# Patient Record
Sex: MALE | Race: WHITE | Employment: OTHER | URBAN - METROPOLITAN AREA
[De-identification: names, ages, dates, MRNs, and addresses within clinical notes are randomized per-mention and may not be internally consistent; named-entity substitution may affect disease eponyms.]

---

## 2017-04-19 ENCOUNTER — OFFICE VISIT (OUTPATIENT)
Dept: FAMILY MEDICINE | Facility: CLINIC | Age: 52
End: 2017-04-19
Payer: COMMERCIAL

## 2017-04-19 VITALS
WEIGHT: 162 LBS | RESPIRATION RATE: 16 BRPM | DIASTOLIC BLOOD PRESSURE: 52 MMHG | OXYGEN SATURATION: 96 % | SYSTOLIC BLOOD PRESSURE: 94 MMHG | TEMPERATURE: 98 F | BODY MASS INDEX: 24.55 KG/M2 | HEART RATE: 59 BPM | HEIGHT: 68 IN

## 2017-04-19 DIAGNOSIS — J20.9 ACUTE BRONCHITIS WITH SYMPTOMS > 10 DAYS: ICD-10-CM

## 2017-04-19 DIAGNOSIS — F43.21 ADJUSTMENT DISORDER WITH DEPRESSED MOOD: Primary | ICD-10-CM

## 2017-04-19 PROCEDURE — 99214 OFFICE O/P EST MOD 30 MIN: CPT | Performed by: NURSE PRACTITIONER

## 2017-04-19 RX ORDER — AZITHROMYCIN 250 MG/1
TABLET, FILM COATED ORAL
Qty: 6 TABLET | Refills: 0 | Status: SHIPPED | OUTPATIENT
Start: 2017-04-19 | End: 2018-03-27

## 2017-04-19 RX ORDER — AZITHROMYCIN 250 MG/1
TABLET, FILM COATED ORAL
Qty: 6 TABLET | Refills: 0 | Status: SHIPPED | OUTPATIENT
Start: 2017-04-19 | End: 2017-04-19

## 2017-04-19 NOTE — NURSING NOTE
"Chief Complaint   Patient presents with     URI       Initial BP 94/52 (BP Location: Left arm, Patient Position: Chair, Cuff Size: Adult Regular)  Pulse 59  Temp 98  F (36.7  C) (Tympanic)  Resp 16  Ht 5' 7.91\" (1.725 m)  Wt 162 lb (73.5 kg)  SpO2 96%  BMI 24.7 kg/m2 Estimated body mass index is 24.7 kg/(m^2) as calculated from the following:    Height as of this encounter: 5' 7.91\" (1.725 m).    Weight as of this encounter: 162 lb (73.5 kg).  Medication Reconciliation: complete  "

## 2017-04-19 NOTE — PROGRESS NOTES
SUBJECTIVE:                                                    Luis Eduardo Stone is a 52 year old male who presents to clinic today for the following health issues:      Acute Illness   Acute illness concerns: cough  Onset: 3 weeks    Fever: not checked    Chills/Sweats: YES    Headache (location?): no     Sinus Pressure:no    Conjunctivitis:  Dry feeling    Ear Pain: no    Rhinorrhea: YES- green drainage    Congestion: YES- nasal, chest    Sore Throat: no      Cough: YES-productive of green sputum, with mild shortness of breath, chest pain    Wheeze: no     Decreased Appetite: YES    Nausea: no     Vomiting: no     Diarrhea:  no     Dysuria/Freq.: no     Fatigue/Achiness: YES- fatigued    Sick/Strep Exposure: no      Therapies Tried and outcome: none      PROBLEMS TO ADD ON...  Abnormal Mood Symptoms     Onset: years    Description:   Depression: YES- working at a job he hates, cat just   Anxiety: no    Accompanying Signs & Symptoms:  Still participating in activities that you used to enjoy: no  Fatigue: YES  Irritability: no  Difficulty concentrating: YES  Changes in appetite: no  Problems with sleep: YES  Heart racing/beating fast : no  Thoughts of hurting yourself or others: none     History:   Recent stress: YES  Prior depression hospitalization: None  Family history of depression: no  Family history of anxiety: no      Precipitating factors:   Alcohol/drug use: no    Alleviating factors:  n/a       Therapies Tried and outcome: None      Problem list and histories reviewed & adjusted, as indicated.  Additional history: as documented    Patient Active Problem List   Diagnosis     CARDIOVASCULAR SCREENING; LDL GOAL LESS THAN 160     Hypothyroidism     Mild asthma exacerbation     Family history of pseudogout     Past Surgical History:   Procedure Laterality Date     ORTHOPEDIC SURGERY  1979    right thumb repair       Social History   Substance Use Topics     Smoking status: Never Smoker     Smokeless  "tobacco: Never Used     Alcohol use Yes      Comment: social     Family History   Problem Relation Age of Onset     Breast Cancer Maternal Grandmother      DIABETES Maternal Grandmother            Reviewed and updated as needed this visit by clinical staff       Reviewed and updated as needed this visit by Provider         ROS:  Constitutional, HEENT, cardiovascular, pulmonary, gi and gu systems are negative, except as otherwise noted.    OBJECTIVE:                                                    BP 94/52 (BP Location: Left arm, Patient Position: Chair, Cuff Size: Adult Regular)  Pulse 59  Temp 98  F (36.7  C) (Tympanic)  Resp 16  Ht 5' 7.91\" (1.725 m)  Wt 162 lb (73.5 kg)  SpO2 96%  BMI 24.7 kg/m2  Body mass index is 24.7 kg/(m^2).  GENERAL: healthy, alert and no distress  EYES: Eyes grossly normal to inspection, PERRL and conjunctivae and sclerae normal  HENT: ear canals and TM's normal, nose and mouth without ulcers or lesions  NECK: no adenopathy, no asymmetry, masses, or scars and thyroid normal to palpation  RESP: no rales , no rhonchi and expiratory wheezes bilateral  CV: regular rates and rhythm, normal S1 S2, no S3 or S4 and no murmur, click or rub  MS: no gross musculoskeletal defects noted, no edema  SKIN: no suspicious lesions or rashes  NEURO: Normal strength and tone, mentation intact and speech normal  PSYCH: mentation appears normal, affect flat, judgement and insight intact and appearance well groomed    Diagnostic Test Results:  none      ASSESSMENT/PLAN:                                                        ICD-10-CM    1. Adjustment disorder with depressed mood F43.21 MENTAL HEALTH REFERRAL   2. Acute bronchitis with symptoms > 10 days J20.9 azithromycin (ZITHROMAX) 250 MG tablet            CONSULTATION/REFERRAL to MENTAL HEALTH    FUTURE APPOINTMENTS:       - Follow up in 1 week for unresolved sx, sooner for new or worsening sx.     Patient Instructions       Adjustment Disorder  Life " changes -- work, family, parents, children -- each can cause a great deal of stress in life. An adjustment disorder means you have trouble dealing with this change and stress. This problem can have serious results. You may feel helpless, depressed, make bad decisions, or even feel like you want to hurt yourself.  Adjustment disorder can cause anxiety or depression. It is triggered by daily stresses such as:    Death of a loved one    Divorce    Marriage    General life changes such as changing or leaving a job    Moving    Illness or other health issue for you or a family  member    Sex    Money     Symptoms may include:    Sadness, crying    Anxiety    Insomnia    Poor concentration    Trouble doing simple things    New problems at work or with family or friends    Loss of self-esteem    Sense of hopelessness    Feeling trapped or cut off from others  With this condition, it is common to feel sad, guilty, hopeless and restless. These feelings may continue for weeks or months. It can be helpful to identify what is causing the additional stress and take steps to get extra support. If new stressful events do not occur, it is likely that you will gradually start feeling better.  Home care    If you have been given a prescription for medicine, take it as directed.    It helps to talk about your feelings and thoughts with family or friends that understand and support you.  Follow-up care  Follow up with your healthcare provider, or therapist as advised. Let them know if this condition does not improve or gets worse.  When to seek medical advice  Call your healthcare provider right away if any of these occur:    Worsening depression or anxiety    Feeling out of control    Thoughts of harming yourself or another    Being unable to care for yourself    4394-1305 [employer]. [address] [place] [address], [place] [address]. All rights reserved. This information is not intended as a substitute for professional medical  care. Always follow your healthcare professional's instructions.        Understanding Adjustment Disorders  Most people have stress in their lives, and sometimes you may have more than you can handle. You may find it hard to cope with a stressful event. As a result, you may become anxious and depressed. You might even get sick. These can be symptoms of an adjustment disorder. But you don t have to suffer. Ask your doctor or mental health professional for help.    Common symptoms of an adjustment disorder    Hopelessness    Frequent crying    Depressed mood    Trembling or twitching    Palpitations    Health problems    Withdrawal    Anxiety or tension   What is an adjustment disorder?  Adjustment disorders sometimes occur when life gets to be too much. They often appear within 3 months of a stressful time. The symptoms vary widely. You might pretend the stressful event never happened. Or, you might think about it so much you can t eat or sleep. In most cases, your feelings may seem beyond your control.  What causes it?  The events that trigger an adjustment disorder vary from person to person. Adults may be troubled by work, money, or marriage problems. Teens are more likely bothered by school or conflict with parents. They also may find it hard to cope with a divorce or sex. The death of a loved one can be especially hard to face. So can major life changes such as a move. Poverty or a lack of social skills may make matters worse.  What can be done?  Adjustment disorders can almost always be helped by therapy. You may feel relieved just to talk to someone. In some cases, only you and your therapist will meet. In others, your whole family may be involved. You might also join a group for people with this disorder. The support and concern of others can help you recover more quickly.    8363-3153 [employer]. [address] [place] [address], [place] [address]. All rights reserved. This information is not intended  as a substitute for professional medical care. Always follow your healthcare professional's instructions.        Bronchitis, Antibiotic Treatment (Adult)    Bronchitis is an infection of the air passages (bronchial tubes) in your lungs. It often occurs when you have a cold. This illness is contagious during the first few days and is spread through the air by coughing and sneezing, or by direct contact (touching the sick person and then touching your own eyes, nose, or mouth).  Symptoms of bronchitis include cough with mucus (phlegm) and low-grade fever. Bronchitis usually lasts 7 to 14 days. Mild cases can be treated with simple home remedies. More severe infection is treated with an antibiotic.  Home care  Follow these guidelines when caring for yourself at home:    If your symptoms are severe, rest at home for the first 2 to 3 days. When you go back to your usual activities, don't let yourself get too tired.    Do not smoke. Also avoid being exposed to secondhand smoke.    You may use over-the-counter medicines to control fever or pain, unless another medicine was prescribed. (Note: If you have chronic liver or kidney disease or have ever had a stomach ulcer or gastrointestinal bleeding, talk with your healthcare provider before using these medicines. Also talk to your provider if you are taking medicine to prevent blood clots.) Aspirin should never be given to anyone younger than 18 years of age who is ill with a viral infection or fever. It may cause severe liver or brain damage.    Your appetite may be poor, so a light diet is fine. Avoid dehydration by drinking 6 to 8 glasses of fluids per day (such as water, soft drinks, sports drinks, juices, tea, or soup). Extra fluids will help loosen secretions in the nose and lungs.    Over-the-counter cough, cold, and sore-throat medicines will not shorten the length of the illness, but they may be helpful to reduce symptoms. (Note: Do not use decongestants if you have  high blood pressure.)    Finish all antibiotic medicine. Do this even if you are feeling better after only a few days.  Follow-up care  Follow up with your healthcare provider, or as advised. If you had an X-ray or ECG (electrocardiogram), a specialist will review it. You will be notified of any new findings that may affect your care.  Note: If you are age 65 or older, or if you have a chronic lung disease or condition that affects your immune system, or you smoke, talk to your healthcare provider about having pneumococcal vaccinations and a yearly influenza vaccination (flu shot).  When to seek medical advice  Call your healthcare provider right away if any of these occur:    Fever of 100.4 F (38 C) or higher    Coughing up increased amounts of colored sputum    Weakness, drowsiness, headache, facial pain, ear pain, or a stiff neck   Call 911, or get immediate medical care  Contact emergency services right away if any of these occur.    Coughing up blood    Worsening weakness, drowsiness, headache, or stiff neck    Trouble breathing, wheezing, or pain with breathing    6446-1294 [employer]. [address] [place] [address], [place] [address]. All rights reserved. This information is not intended as a substitute for professional medical care. Always follow your healthcare professional's instructions.            MINO Mcintosh [facility]

## 2017-04-19 NOTE — MR AVS SNAPSHOT
After Visit Summary   4/19/2017    Luis Eduardo Stone    MRN: [id number]           Patient Information     Date Of Birth          1965        Visit Information        Provider Department      4/19/2017 2:00 PM Renata Santamaria APRN [facility]        Today's Diagnoses     Adjustment disorder with depressed mood    -  1    Acute bronchitis with symptoms > 10 days          Care Instructions      Adjustment Disorder  Life changes -- work, family, parents, children -- each can cause a great deal of stress in life. An adjustment disorder means you have trouble dealing with this change and stress. This problem can have serious results. You may feel helpless, depressed, make bad decisions, or even feel like you want to hurt yourself.  Adjustment disorder can cause anxiety or depression. It is triggered by daily stresses such as:    Death of a loved one    Divorce    Marriage    General life changes such as changing or leaving a job    Moving    Illness or other health issue for you or a family  member    Sex    Money     Symptoms may include:    Sadness, crying    Anxiety    Insomnia    Poor concentration    Trouble doing simple things    New problems at work or with family or friends    Loss of self-esteem    Sense of hopelessness    Feeling trapped or cut off from others  With this condition, it is common to feel sad, guilty, hopeless and restless. These feelings may continue for weeks or months. It can be helpful to identify what is causing the additional stress and take steps to get extra support. If new stressful events do not occur, it is likely that you will gradually start feeling better.  Home care    If you have been given a prescription for medicine, take it as directed.    It helps to talk about your feelings and thoughts with family or friends that understand and support you.  Follow-up care  Follow up with your healthcare provider, or therapist as advised. Let them  know if this condition does not improve or gets worse.  When to seek medical advice  Call your healthcare provider right away if any of these occur:    Worsening depression or anxiety    Feeling out of control    Thoughts of harming yourself or another    Being unable to care for yourself    3027-6671 [employer]. [address] [place] [address]. All rights reserved. This information is not intended as a substitute for professional medical care. Always follow your healthcare professional's instructions.        Understanding Adjustment Disorders  Most people have stress in their lives, and sometimes you may have more than you can handle. You may find it hard to cope with a stressful event. As a result, you may become anxious and depressed. You might even get sick. These can be symptoms of an adjustment disorder. But you don t have to suffer. Ask your doctor or mental health professional for help.    Common symptoms of an adjustment disorder    Hopelessness    Frequent crying    Depressed mood    Trembling or twitching    Palpitations    Health problems    Withdrawal    Anxiety or tension   What is an adjustment disorder?  Adjustment disorders sometimes occur when life gets to be too much. They often appear within 3 months of a stressful time. The symptoms vary widely. You might pretend the stressful event never happened. Or, you might think about it so much you can t eat or sleep. In most cases, your feelings may seem beyond your control.  What causes it?  The events that trigger an adjustment disorder vary from person to person. Adults may be troubled by work, money, or marriage problems. Teens are more likely bothered by school or conflict with parents. They also may find it hard to cope with a divorce or sex. The death of a loved one can be especially hard to face. So can major life changes such as a move. Poverty or a lack of social skills may make matters worse.  What can be  done?  Adjustment disorders can almost always be helped by therapy. You may feel relieved just to talk to someone. In some cases, only you and your therapist will meet. In others, your whole family may be involved. You might also join a group for people with this disorder. The support and concern of others can help you recover more quickly.    0439-9126 [employer]. [address] [place] [address]. All rights reserved. This information is not intended as a substitute for professional medical care. Always follow your healthcare professional's instructions.        Bronchitis, Antibiotic Treatment (Adult)    Bronchitis is an infection of the air passages (bronchial tubes) in your lungs. It often occurs when you have a cold. This illness is contagious during the first few days and is spread through the air by coughing and sneezing, or by direct contact (touching the sick person and then touching your own eyes, nose, or mouth).  Symptoms of bronchitis include cough with mucus (phlegm) and low-grade fever. Bronchitis usually lasts 7 to 14 days. Mild cases can be treated with simple home remedies. More severe infection is treated with an antibiotic.  Home care  Follow these guidelines when caring for yourself at home:    If your symptoms are severe, rest at home for the first 2 to 3 days. When you go back to your usual activities, don't let yourself get too tired.    Do not smoke. Also avoid being exposed to secondhand smoke.    You may use over-the-counter medicines to control fever or pain, unless another medicine was prescribed. (Note: If you have chronic liver or kidney disease or have ever had a stomach ulcer or gastrointestinal bleeding, talk with your healthcare provider before using these medicines. Also talk to your provider if you are taking medicine to prevent blood clots.) Aspirin should never be given to anyone younger than 18 years of age who is ill with a viral infection or fever. It  may cause severe liver or brain damage.    Your appetite may be poor, so a light diet is fine. Avoid dehydration by drinking 6 to 8 glasses of fluids per day (such as water, soft drinks, sports drinks, juices, tea, or soup). Extra fluids will help loosen secretions in the nose and lungs.    Over-the-counter cough, cold, and sore-throat medicines will not shorten the length of the illness, but they may be helpful to reduce symptoms. (Note: Do not use decongestants if you have high blood pressure.)    Finish all antibiotic medicine. Do this even if you are feeling better after only a few days.  Follow-up care  Follow up with your healthcare provider, or as advised. If you had an X-ray or ECG (electrocardiogram), a specialist will review it. You will be notified of any new findings that may affect your care.  Note: If you are age 65 or older, or if you have a chronic lung disease or condition that affects your immune system, or you smoke, talk to your healthcare provider about having pneumococcal vaccinations and a yearly influenza vaccination (flu shot).  When to seek medical advice  Call your healthcare provider right away if any of these occur:    Fever of 100.4 F (38 C) or higher    Coughing up increased amounts of colored sputum    Weakness, drowsiness, headache, facial pain, ear pain, or a stiff neck   Call 911, or get immediate medical care  Contact emergency services right away if any of these occur.    Coughing up blood    Worsening weakness, drowsiness, headache, or stiff neck    Trouble breathing, wheezing, or pain with breathing    3929-7747 [employer]. [address] [place] [address], [place] [address]. All rights reserved. This information is not intended as a substitute for professional medical care. Always follow your healthcare professional's instructions.              Follow-ups after your visit        Additional Services     MENTAL HEALTH REFERRAL       Your provider has referred you to:  Behavioral Healthcare Providers Intake Scheduling [phone]  [email or web address]    All scheduling is subject to the client's specific insurance plan & benefits, provider/location availability, and provider clinical specialities.  Please arrive 15 minutes early for your first appointment and bring your completed paperwork.    Please be aware that coverage of these services is subject to the terms and limitations of your health insurance plan.  Call member services at your health plan with any benefit or coverage questions.                  Who to contact     If you have questions or need follow up information about today's clinic visit or your schedule please contact [facility] directly at [phone].  Normal or non-critical lab and imaging results will be communicated to you by "[employer]hart, letter or phone within 4 business days after the clinic has received the results. If you do not hear from us within 7 days, please contact the clinic through [employer] or phone. If you have a critical or abnormal lab result, we will notify you by phone as soon as possible.  Submit refill requests through [employer] or call your pharmacy and they will forward the refill request to us. Please allow 3 business days for your refill to be completed.          Additional Information About Your Visit        [employer] Information     [employer] gives you secure access to your electronic health record. If you see a primary care provider, you can also send messages to your care team and make appointments. If you have questions, please call your primary care clinic.  If you do not have a primary care provider, please call [phone] and they will assist you.        Care EveryWhere ID     This is your Care EveryWhere ID. This could be used by other organizations to access your [facility] medical records  [id number]        Your Vitals Were     Pulse Temperature Respirations Height Pulse Oximetry BMI (Body Mass Index)    59 98  F  "(36.7  C) (Tympanic) 16 5' 7.91\" (1.725 m) 96% 24.7 kg/m2       Blood Pressure from Last 3 Encounters:   04/19/17 94/52   11/04/16 134/77   06/16/16 108/74    Weight from Last 3 Encounters:   04/19/17 162 lb (73.5 kg)   11/04/16 160 lb (72.6 kg)   06/16/16 160 lb (72.6 kg)              We Performed the Following     MENTAL HEALTH REFERRAL          Today's Medication Changes          These changes are accurate as of: 4/19/17  2:28 PM.  If you have any questions, ask your nurse or doctor.               Start taking these medicines.        Dose/Directions    azithromycin 250 MG tablet   Commonly known as:  ZITHROMAX   Used for:  Acute bronchitis with symptoms > 10 days   Started by:  Renata Santamaria APRN CNP        Two tablets first day, then one tablet daily for four days.   Quantity:  6 tablet   Refills:  0            Where to get your medicines      These medications were sent to [facility] [address] [place] [address] AT [address] [address]     Phone:  [phone]     azithromycin 250 MG tablet                Primary Care Provider    None Specified       No primary provider on file.        Thank you!     Thank you for choosing [facility]  for your care. Our goal is always to provide you with excellent care. Hearing back from our patients is one way we can continue to improve our services. Please take a few minutes to complete the written survey that you may receive in the mail after your visit with us. Thank you!             Your Updated Medication List - Protect others around you: Learn how to safely use, store and throw away your medicines at www.disposemymeds.org.          This list is accurate as of: 4/19/17  2:28 PM.  Always use your most recent med list.                   Brand Name Dispense Instructions for use    ALBUTEROL INHALER 17GM      This product no longer available       azithromycin 250 MG tablet    ZITHROMAX    6 tablet "    Two tablets first day, then one tablet daily for four days.       fluticasone 50 MCG/ACT spray    FLONASE    1 g    Spray 1-2 sprays into both nostrils daily       indomethacin 25 MG capsule    INDOCIN    60 capsule    Take 1 capsule (25 mg) by mouth 3 times daily as needed for moderate pain       SYNTHROID 125 MCG tablet   Generic drug:  levothyroxine     90 tablet    Take 1 tablet (125 mcg) by mouth daily       thyroid 30 MG tablet    TOO THYROID    90 tablet    Take 1 tablet (30 mg) by mouth daily

## 2017-04-19 NOTE — PATIENT INSTRUCTIONS
Adjustment Disorder  Life changes -- work, family, parents, children -- each can cause a great deal of stress in life. An adjustment disorder means you have trouble dealing with this change and stress. This problem can have serious results. You may feel helpless, depressed, make bad decisions, or even feel like you want to hurt yourself.  Adjustment disorder can cause anxiety or depression. It is triggered by daily stresses such as:    Death of a loved one    Divorce    Marriage    General life changes such as changing or leaving a job    Moving    Illness or other health issue for you or a family  member    Sex    Money     Symptoms may include:    Sadness, crying    Anxiety    Insomnia    Poor concentration    Trouble doing simple things    New problems at work or with family or friends    Loss of self-esteem    Sense of hopelessness    Feeling trapped or cut off from others  With this condition, it is common to feel sad, guilty, hopeless and restless. These feelings may continue for weeks or months. It can be helpful to identify what is causing the additional stress and take steps to get extra support. If new stressful events do not occur, it is likely that you will gradually start feeling better.  Home care    If you have been given a prescription for medicine, take it as directed.    It helps to talk about your feelings and thoughts with family or friends that understand and support you.  Follow-up care  Follow up with your healthcare provider, or therapist as advised. Let them know if this condition does not improve or gets worse.  When to seek medical advice  Call your healthcare provider right away if any of these occur:    Worsening depression or anxiety    Feeling out of control    Thoughts of harming yourself or another    Being unable to care for yourself    8560-8173 [employer]. [address] [place] [address], [place] [address]. All rights reserved. This information is not intended as a  substitute for professional medical care. Always follow your healthcare professional's instructions.        Understanding Adjustment Disorders  Most people have stress in their lives, and sometimes you may have more than you can handle. You may find it hard to cope with a stressful event. As a result, you may become anxious and depressed. You might even get sick. These can be symptoms of an adjustment disorder. But you don t have to suffer. Ask your doctor or mental health professional for help.    Common symptoms of an adjustment disorder    Hopelessness    Frequent crying    Depressed mood    Trembling or twitching    Palpitations    Health problems    Withdrawal    Anxiety or tension   What is an adjustment disorder?  Adjustment disorders sometimes occur when life gets to be too much. They often appear within 3 months of a stressful time. The symptoms vary widely. You might pretend the stressful event never happened. Or, you might think about it so much you can t eat or sleep. In most cases, your feelings may seem beyond your control.  What causes it?  The events that trigger an adjustment disorder vary from person to person. Adults may be troubled by work, money, or marriage problems. Teens are more likely bothered by school or conflict with parents. They also may find it hard to cope with a divorce or sex. The death of a loved one can be especially hard to face. So can major life changes such as a move. Poverty or a lack of social skills may make matters worse.  What can be done?  Adjustment disorders can almost always be helped by therapy. You may feel relieved just to talk to someone. In some cases, only you and your therapist will meet. In others, your whole family may be involved. You might also join a group for people with this disorder. The support and concern of others can help you recover more quickly.    4764-3795 [employer]. [address] [place] [address]. All rights  reserved. This information is not intended as a substitute for professional medical care. Always follow your healthcare professional's instructions.        Bronchitis, Antibiotic Treatment (Adult)    Bronchitis is an infection of the air passages (bronchial tubes) in your lungs. It often occurs when you have a cold. This illness is contagious during the first few days and is spread through the air by coughing and sneezing, or by direct contact (touching the sick person and then touching your own eyes, nose, or mouth).  Symptoms of bronchitis include cough with mucus (phlegm) and low-grade fever. Bronchitis usually lasts 7 to 14 days. Mild cases can be treated with simple home remedies. More severe infection is treated with an antibiotic.  Home care  Follow these guidelines when caring for yourself at home:    If your symptoms are severe, rest at home for the first 2 to 3 days. When you go back to your usual activities, don't let yourself get too tired.    Do not smoke. Also avoid being exposed to secondhand smoke.    You may use over-the-counter medicines to control fever or pain, unless another medicine was prescribed. (Note: If you have chronic liver or kidney disease or have ever had a stomach ulcer or gastrointestinal bleeding, talk with your healthcare provider before using these medicines. Also talk to your provider if you are taking medicine to prevent blood clots.) Aspirin should never be given to anyone younger than 18 years of age who is ill with a viral infection or fever. It may cause severe liver or brain damage.    Your appetite may be poor, so a light diet is fine. Avoid dehydration by drinking 6 to 8 glasses of fluids per day (such as water, soft drinks, sports drinks, juices, tea, or soup). Extra fluids will help loosen secretions in the nose and lungs.    Over-the-counter cough, cold, and sore-throat medicines will not shorten the length of the illness, but they may be helpful to reduce  symptoms. (Note: Do not use decongestants if you have high blood pressure.)    Finish all antibiotic medicine. Do this even if you are feeling better after only a few days.  Follow-up care  Follow up with your healthcare provider, or as advised. If you had an X-ray or ECG (electrocardiogram), a specialist will review it. You will be notified of any new findings that may affect your care.  Note: If you are age 65 or older, or if you have a chronic lung disease or condition that affects your immune system, or you smoke, talk to your healthcare provider about having pneumococcal vaccinations and a yearly influenza vaccination (flu shot).  When to seek medical advice  Call your healthcare provider right away if any of these occur:    Fever of 100.4 F (38 C) or higher    Coughing up increased amounts of colored sputum    Weakness, drowsiness, headache, facial pain, ear pain, or a stiff neck   Call 911, or get immediate medical care  Contact emergency services right away if any of these occur.    Coughing up blood    Worsening weakness, drowsiness, headache, or stiff neck    Trouble breathing, wheezing, or pain with breathing    5539-3362 The [facility]. [address] [place] [address]. All rights reserved. This information is not intended as a substitute for professional medical care. Always follow your healthcare professional's instructions.

## 2017-04-20 ENCOUNTER — TELEPHONE (OUTPATIENT)
Dept: FAMILY MEDICINE | Facility: CLINIC | Age: 52
End: 2017-04-20

## 2017-04-20 ASSESSMENT — PATIENT HEALTH QUESTIONNAIRE - PHQ9: SUM OF ALL RESPONSES TO PHQ QUESTIONS 1-9: 13

## 2017-04-20 NOTE — LETTER
[facility]  [address] [place] [id number]  Phone: [phone]    04/20/17    Woodtessa BOLTON Bertha  [address] [address]      To whom it may concern:     Luis Eduardo was seen in clinic 4/19/17. Please excuse him from missed work.    Sincerely,      MINO Mcintosh CNP

## 2017-04-20 NOTE — TELEPHONE ENCOUNTER
Reason for Call:  Other call back and Letter    Detailed comments: He was seen yesterday and needs a letter for work.  He is wondering if the letter can be sent through my-chart?  Please advise.    Phone Number Patient can be reached at: Home number on file [phone] (home)    Best Time: any    Can we leave a detailed message on this number? YES    Call taken on 4/20/2017 at 7:39 AM by Earnestine Valencia

## 2017-04-20 NOTE — TELEPHONE ENCOUNTER
Letter sent through Delivery Agent, unable to send with signature. Patient is to notify if letter should be faxed or picked up with signature. MINO Allison CNP

## 2017-10-18 ENCOUNTER — TELEPHONE (OUTPATIENT)
Dept: FAMILY MEDICINE | Facility: CLINIC | Age: 52
End: 2017-10-18

## 2017-10-18 NOTE — LETTER
October 18, 2017      Luis Eduardo Stone  [address] [address]        Dear Dr. Vanessa Hoff's Care Team has been reviewing your chart and at this time you are due for colorectal cancer screening.  You can either have a colonoscopy or complete a FIT test at home.  Please call and schedule an appointment.   We are trying to help our patients achieve and maintain their health care goals.  If you are receiving your healthcare at another facility, please notify us of that as well, as we will update your chart.      If you have any questions, please feel free to call the clinic at [phone].    Thank you.        Sincerely,        Anh Aquino MD/kr

## 2017-11-29 ENCOUNTER — TELEPHONE (OUTPATIENT)
Dept: FAMILY MEDICINE | Facility: CLINIC | Age: 52
End: 2017-11-29

## 2017-11-29 DIAGNOSIS — Z13.6 CARDIOVASCULAR SCREENING; LDL GOAL LESS THAN 160: Primary | ICD-10-CM

## 2017-11-29 DIAGNOSIS — E03.9 HYPOTHYROIDISM: ICD-10-CM

## 2017-11-29 NOTE — TELEPHONE ENCOUNTER
Reason for Call: Request for an order or referral:    Order or referral being requested: Blood work    Date needed: Before Friday    Has the patient been seen by the PCP for this problem? unsure    Additional comments: He has an appt on Friday to get his Thyroid checked.  He would like the orders placed.  Please advise.    Phone number Patient can be reached at:  Home number on file [phone] (home)    Best Time:  any    Can we leave a detailed message on this number?  YES    Call taken on 11/29/2017 at 4:46 PM by Earnestine Valencia

## 2017-11-30 NOTE — TELEPHONE ENCOUNTER
Patient called and told of order being placed. Offered Lipid but patient declined. Elina MORAES RN

## 2017-12-01 DIAGNOSIS — E03.9 HYPOTHYROIDISM, UNSPECIFIED TYPE: ICD-10-CM

## 2017-12-01 DIAGNOSIS — E03.9 HYPOTHYROIDISM: ICD-10-CM

## 2017-12-01 DIAGNOSIS — E06.3 HYPOTHYROIDISM DUE TO HASHIMOTO'S THYROIDITIS: ICD-10-CM

## 2017-12-01 LAB — TSH SERPL DL<=0.005 MIU/L-ACNC: 0.62 MU/L (ref 0.4–4)

## 2017-12-01 PROCEDURE — 84443 ASSAY THYROID STIM HORMONE: CPT | Performed by: FAMILY MEDICINE

## 2017-12-01 PROCEDURE — 36415 COLL VENOUS BLD VENIPUNCTURE: CPT | Performed by: FAMILY MEDICINE

## 2017-12-04 NOTE — PROGRESS NOTES
Please inform patient that test result was within normal parameters.   Thank you.     Anh Aquino M.D.

## 2017-12-05 RX ORDER — LEVOTHYROXINE SODIUM 125 MCG
125 TABLET ORAL DAILY
Qty: 30 TABLET | Refills: 0 | Status: SHIPPED | OUTPATIENT
Start: 2017-12-05 | End: 2017-12-13

## 2017-12-05 NOTE — TELEPHONE ENCOUNTER
11/4/2016  [facility]  Clinic coordinator notified to contact pt for appointment and needed labs. 30 day rebecca refill sent to pharmacy.

## 2017-12-11 ENCOUNTER — TELEPHONE (OUTPATIENT)
Dept: ENDOCRINOLOGY | Facility: CLINIC | Age: 52
End: 2017-12-11

## 2017-12-11 NOTE — TELEPHONE ENCOUNTER
Left voicemail to pt re: 30 day refill sent to [facility] 12/5 with zero refills, he needs to be seen in clinic for further refills or have his PCP do future refills.

## 2017-12-11 NOTE — TELEPHONE ENCOUNTER
Pt called to request refill for Thyroid NP.    Pt has already received 2 emergency supplies from pharmacy & has 1 day supply left.     Pt preferred pharm is [facility] [address] [place] [address] AT [facility]    Pt can be called at [phone].

## 2017-12-12 DIAGNOSIS — E06.3 HYPOTHYROIDISM DUE TO HASHIMOTO'S THYROIDITIS: ICD-10-CM

## 2017-12-12 DIAGNOSIS — E03.9 HYPOTHYROIDISM, UNSPECIFIED TYPE: ICD-10-CM

## 2017-12-13 ENCOUNTER — TELEPHONE (OUTPATIENT)
Dept: FAMILY MEDICINE | Facility: CLINIC | Age: 52
End: 2017-12-13

## 2017-12-13 ENCOUNTER — CARE COORDINATION (OUTPATIENT)
Dept: ENDOCRINOLOGY | Facility: CLINIC | Age: 52
End: 2017-12-13

## 2017-12-13 ENCOUNTER — TELEPHONE (OUTPATIENT)
Dept: ENDOCRINOLOGY | Facility: CLINIC | Age: 52
End: 2017-12-13

## 2017-12-13 DIAGNOSIS — E03.9 HYPOTHYROIDISM, UNSPECIFIED TYPE: ICD-10-CM

## 2017-12-13 DIAGNOSIS — E06.3 HYPOTHYROIDISM DUE TO HASHIMOTO'S THYROIDITIS: ICD-10-CM

## 2017-12-13 RX ORDER — THYROID 30 MG/1
30 TABLET ORAL DAILY
Qty: 90 TABLET | Refills: 0 | Status: SHIPPED | OUTPATIENT
Start: 2017-12-13 | End: 2018-02-26

## 2017-12-13 RX ORDER — LEVOTHYROXINE SODIUM 125 MCG
125 TABLET ORAL DAILY
Qty: 90 TABLET | Refills: 0 | Status: CANCELLED | OUTPATIENT
Start: 2017-12-13

## 2017-12-13 RX ORDER — LEVOTHYROXINE SODIUM 125 MCG
125 TABLET ORAL DAILY
Qty: 90 TABLET | Refills: 0 | Status: SHIPPED | OUTPATIENT
Start: 2017-12-13 | End: 2018-04-06

## 2017-12-13 RX ORDER — THYROID 30 MG/1
30 TABLET ORAL DAILY
Qty: 90 TABLET | Refills: 0 | Status: CANCELLED | OUTPATIENT
Start: 2017-12-13

## 2017-12-13 NOTE — TELEPHONE ENCOUNTER
"Social Work Brief Intervention  [facility]    Data/Intervention:    Patient Name:  Luis Eduardo Stone  /Age:  1965 (52 year old)    Visit Type: telephone  Referral Source: Zee Negron RN  Reason for Referral:  Insurance and coping concerns    Collaborated With:    Xin Yoo    Patient Concerns/Issues:  Pt needing medication refills and hasn't had appt here since 2016. He reports that he is trying to work with [employer] on a plan. He was previously on [employer] but his income is higher now and he likely needs to purchase a health plan thru [employer]. Offered info re a [employer] navigator that could assist him in his area with getting a health plan but he declined. He felt he wasn't treated well trying to get refills on his meds and he got frustrated. He commented to Zee that he can get suicidal when he's off the meds. He indicated to me that has had times of being depressed and feeling suicidal but not currently. He said \"my brother was murdered, I wouldn't do that to my family\" (suicide). He tried Wellbutrin in the past but didn't like the side effects. He states he has had bad experiences with psychologists in the past.    Intervention/Education/Resources Provided:  Provided info re getting coupons online for discount on his medications.   Discussed getting treatment for depression like other health conditions and that often people have to try different medications to find the best fit. Encouraged him to go to the ER, call his PCP if he has increased symptoms of depression.     Assessment/Plan:  Pt satisfied with getting his RX re filled and with the info discussed today. No further f/u planned at this time.     Provided patient/family with contact information and availability to follow up as needed.        "

## 2018-01-03 DIAGNOSIS — E03.9 HYPOTHYROIDISM, UNSPECIFIED TYPE: ICD-10-CM

## 2018-01-03 DIAGNOSIS — E06.3 HYPOTHYROIDISM DUE TO HASHIMOTO'S THYROIDITIS: ICD-10-CM

## 2018-01-03 RX ORDER — LEVOTHYROXINE SODIUM 125 MCG
125 TABLET ORAL DAILY
Qty: 90 TABLET | Refills: 0 | Status: CANCELLED | OUTPATIENT
Start: 2018-01-03

## 2018-02-26 DIAGNOSIS — E06.3 HYPOTHYROIDISM DUE TO HASHIMOTO'S THYROIDITIS: ICD-10-CM

## 2018-02-27 RX ORDER — THYROID 30 MG/1
30 TABLET ORAL DAILY
Qty: 90 TABLET | Refills: 0 | Status: SHIPPED | OUTPATIENT
Start: 2018-02-27 | End: 2018-04-05

## 2018-02-27 NOTE — TELEPHONE ENCOUNTER
lvd  11/2016  I left him a message on scheduling. He was  working on finding insurance in December so I asked for an update.

## 2018-02-27 NOTE — TELEPHONE ENCOUNTER
Last Clinic Visit:  11/4/16    NV NONE         RF   12/13/17   90:0  MD APPT. OVERDUE  90 RF 12/13/17    NO PENDING  APPT.  Scheduling has been notified to contact the pt for  MD appointment

## 2018-03-01 ENCOUNTER — TELEPHONE (OUTPATIENT)
Dept: ENDOCRINOLOGY | Facility: CLINIC | Age: 53
End: 2018-03-01

## 2018-03-02 NOTE — TELEPHONE ENCOUNTER
PA needed for  Thyroid Sanders 30 mg  1 tablet daily.  DX Hypothyroid. He has used levothyroxine  back in 2001  When DX Then has been on Brand Synthroid and Sanders. He did try just Synthroid at one point but did not feel right on it.  The Synthroid- armour combination works well with him  with stable  levels and feels well.  Wish to continue  With Sanders 30 mg

## 2018-03-05 ENCOUNTER — TELEPHONE (OUTPATIENT)
Dept: ENDOCRINOLOGY | Facility: CLINIC | Age: 53
End: 2018-03-05

## 2018-03-05 NOTE — TELEPHONE ENCOUNTER
Central Prior Authorization Team   Phone: [phone]      PA Initiation    Medication: thyroid (ARMOUR THYROID) 30 MG tablet-PA initiated  Insurance Company: [employer] - Phone [phone] Fax [phone]  Pharmacy Filling the Rx: [facility] [address] [place] [address] AT [facility]  Filling Pharmacy Phone: [phone]  Filling Pharmacy Fax:    Start Date: 3/5/2018

## 2018-03-05 NOTE — TELEPHONE ENCOUNTER
----- Message from Dany Erazo MD sent at 3/5/2018  4:50 PM CST -----  Hello,  I don't think it is related to thyroid issue. He should talk to ophthalmologist.    Thanks,  Dany    ----- Message -----     From: Zee Negron RN     Sent: 3/5/2018   1:18 PM       To: Dany Erazo MD    Having macular edema   wondering if related to  Thyroid  Or how related to   and what can be done? He didn't bring it up to the Opthamologist  His last visit with me was on Visit date not found. Seen 2016 scheduled to come in this month I told him to call the opthamologist and then discuss at f/u

## 2018-03-05 NOTE — TELEPHONE ENCOUNTER
Prior Authorization Approval    Authorization Effective Date: 2/5/2018  Authorization Expiration Date: 3/5/2023  Medication: thyroid (ARMOUR THYROID) 30 MG tablet-PA Approved  Approved Dose/Quantity:   Reference #: [id number]   Insurance Company: [employer] - Phone [phone] Fax [phone]  Expected CoPay:     $18.56  CoPay Card Available:      Foundation Assistance Needed:    Which Pharmacy is filling the prescription (Not needed for infusion/clinic administered): [facility] [address] [place] [address] AT [facility]  Pharmacy Notified: Yes  Patient Notified: Yes

## 2018-03-05 NOTE — TELEPHONE ENCOUNTER
I  already told him to discuss with  His ophthalmologist and Dr Saenz will then discuss at f/u appointment.

## 2018-03-27 ENCOUNTER — OFFICE VISIT (OUTPATIENT)
Dept: FAMILY MEDICINE | Facility: CLINIC | Age: 53
End: 2018-03-27
Payer: COMMERCIAL

## 2018-03-27 VITALS
DIASTOLIC BLOOD PRESSURE: 72 MMHG | HEART RATE: 62 BPM | BODY MASS INDEX: 24.55 KG/M2 | TEMPERATURE: 97.1 F | HEIGHT: 68 IN | SYSTOLIC BLOOD PRESSURE: 110 MMHG | OXYGEN SATURATION: 96 % | WEIGHT: 162 LBS

## 2018-03-27 DIAGNOSIS — E03.9 HYPOTHYROIDISM, UNSPECIFIED TYPE: ICD-10-CM

## 2018-03-27 DIAGNOSIS — J45.901 MILD ASTHMA EXACERBATION: ICD-10-CM

## 2018-03-27 DIAGNOSIS — Z86.69 HISTORY OF DETACHED RETINA REPAIR: ICD-10-CM

## 2018-03-27 DIAGNOSIS — Z23 NEED FOR VACCINATION: ICD-10-CM

## 2018-03-27 DIAGNOSIS — Z12.11 SPECIAL SCREENING FOR MALIGNANT NEOPLASMS, COLON: ICD-10-CM

## 2018-03-27 DIAGNOSIS — Z98.890 HISTORY OF DETACHED RETINA REPAIR: ICD-10-CM

## 2018-03-27 DIAGNOSIS — Z01.818 PREOP GENERAL PHYSICAL EXAM: Primary | ICD-10-CM

## 2018-03-27 PROCEDURE — 90471 IMMUNIZATION ADMIN: CPT | Performed by: NURSE PRACTITIONER

## 2018-03-27 PROCEDURE — 90715 TDAP VACCINE 7 YRS/> IM: CPT | Performed by: NURSE PRACTITIONER

## 2018-03-27 PROCEDURE — 99214 OFFICE O/P EST MOD 30 MIN: CPT | Performed by: NURSE PRACTITIONER

## 2018-03-27 NOTE — NURSING NOTE
"Initial /72 (BP Location: Left arm, Patient Position: Chair, Cuff Size: Adult Regular)  Pulse 62  Temp 97.1  F (36.2  C) (Tympanic)  Ht 5' 7.9\" (1.725 m)  Wt 162 lb (73.5 kg)  SpO2 96%  BMI 24.7 kg/m2 Estimated body mass index is 24.7 kg/(m^2) as calculated from the following:    Height as of this encounter: 5' 7.9\" (1.725 m).    Weight as of this encounter: 162 lb (73.5 kg). .    Laurie Dodge    "

## 2018-03-27 NOTE — PROGRESS NOTES
[facility]  [address] [facility] [address]  [phone]  Dept: [phone]    PRE-OP EVALUATION:  Today's date: 3/27/2018    Luis Eduardo Stone (: 1965) presents for pre-operative evaluation assessment as requested by Dr. Hernandez.  He requires evaluation and anesthesia risk assessment prior to undergoing surgery/procedure for treatment of Epiretinal Membrane Repair .    Proposed Surgery/ Procedure: Vitrectomy, membrane peel of the right eye  Date of Surgery/ Procedure: 2018  Time of Surgery/ Procedure: to be determined  Hospital/Surgical Facility: [facility]  Fax number for surgical facility: [phone]  Primary Physician: System, Provider Not In  Type of Anesthesia Anticipated: to be determined    Patient has a Health Care Directive or Living Will:  NO    1. NO - Do you have a history of heart attack, stroke, stent, bypass or surgery on an artery in the head, neck, heart or legs?  2. NO - Do you ever have any pain or discomfort in your chest?  3. NO - Do you have a history of  Heart Failure?  4. NO - Are you troubled by shortness of breath when: walking on the level, up a slight hill or at night?  5. NO - Do you currently have a cold, bronchitis or other respiratory infection?  6. NO - Do you have a cough, shortness of breath or wheezing?  7. NO - Do you sometimes get pains in the calves of your legs when you walk?  8. NO - Do you or anyone in your family have previous history of blood clots?  9. NO - Do you or does anyone in your family have a serious bleeding problem such as prolonged bleeding following surgeries or cuts?  10. NO - Have you ever had problems with anemia or been told to take iron pills?  11. NO - Have you had any abnormal blood loss such as black, tarry or bloody stools, or abnormal vaginal bleeding?  12. NO - Have you ever had a blood transfusion?  13. NO - Have you or any of your relatives ever had problems with anesthesia?  14. YES  - Do you have sleep apnea, excessive snoring or daytime drowsiness? snoring  15. NO - Do you have any prosthetic heart valves?  16. NO - Do you have prosthetic joints?  17. NO - Is there any chance that you may be pregnant?      HPI:     HPI related to upcoming procedure: patient planning to have Vitrectomy, membrane peel of the right eye        ASTHMA - Patient has a longstanding history of moderate-severe Asthma . Patient has been doing well overall noting no symptoms  and continues on medication regimen consisting of Albuterol  without adverse reactions or side effects.                                                                                                                                               .  HYPOTHYROIDISM - Patient has a longstanding history of chronic Hypothyroidism. Patient has been doing well, noting no tremor, insomnia, hair loss or changes in skin texture. Last TSH value of 0.62 . Continues to take medications as directed, without adverse reactions or side effects.                                                                                                                                                                                                                        .    MEDICAL HISTORY:     Patient Active Problem List    Diagnosis Date Noted     Family history of pseudogout 10/06/2015     Priority: Medium     Mild asthma exacerbation 04/08/2015     Priority: Medium     CARDIOVASCULAR SCREENING; LDL GOAL LESS THAN 160 03/02/2015     Priority: Medium     Hypothyroidism 03/02/2015     Priority: Medium      No past medical history on file.  Past Surgical History:   Procedure Laterality Date     ORTHOPEDIC SURGERY  1979    right thumb repair     Current Outpatient Prescriptions   Medication Sig Dispense Refill     thyroid (ARMOUR THYROID) 30 MG tablet Take 1 tablet (30 mg) by mouth daily *MUST BE SEEN FOR FURTHER REFILLS 90 tablet 0     SYNTHROID 125 MCG tablet Take 1 tablet (125  "mcg) by mouth daily 90 tablet 0     fluticasone (FLONASE) 50 MCG/ACT nasal spray Spray 1-2 sprays into both nostrils daily (Patient not taking: Reported on 4/19/2017) 1 g 11     indomethacin (INDOCIN) 25 MG capsule Take 1 capsule (25 mg) by mouth 3 times daily as needed for moderate pain (Patient not taking: Reported on 4/19/2017) 60 capsule 1     ALBUTEROL INHALER 17GM This product no longer available       OTC products: None, except as noted above    Allergies   Allergen Reactions     Apple GI Disturbance     Penicillins Rash      Latex Allergy: NO    Social History   Substance Use Topics     Smoking status: Never Smoker     Smokeless tobacco: Never Used     Alcohol use Yes      Comment: social     History   Drug Use No       REVIEW OF SYSTEMS:   CONSTITUTIONAL: NEGATIVE for fever, chills, change in weight  EYES: POSITIVE for history of detached retina   ENT/MOUTH: NEGATIVE for ear, mouth and throat problems  RESP: NEGATIVE for significant cough or SOB  CV: NEGATIVE for chest pain, palpitations or peripheral edema  ROS otherwise negative    EXAM:   /72 (BP Location: Left arm, Patient Position: Chair, Cuff Size: Adult Regular)  Pulse 62  Temp 97.1  F (36.2  C) (Tympanic)  Ht 5' 7.9\" (1.725 m)  Wt 162 lb (73.5 kg)  SpO2 96%  BMI 24.7 kg/m2  GENERAL APPEARANCE: healthy, alert and no distress  HENT: ear canals and TM's normal and nose and mouth without ulcers or lesions  RESP: lungs clear to auscultation - no rales, rhonchi or wheezes  CV: regular rate and rhythm, normal S1 S2, no S3 or S4 and no murmur, click or rub   ABDOMEN: soft, nontender, no HSM or masses and bowel sounds normal  NEURO: Normal strength and tone, sensory exam grossly normal, mentation intact and speech normal    DIAGNOSTICS:   No labs or EKG required for low risk surgery (cataract, skin procedure, breast biopsy, etc)    Recent Labs   Lab Test  04/14/16   0741   NA  141   POTASSIUM  4.1   CR  0.86        IMPRESSION:   Reason for " surgery/procedure:  treatment of Epiretinal Membrane Repair .  Proposed Surgery/ Procedure: Vitrectomy, membrane peel of the right eye    Diagnosis/reason for consult: Preoperative exam    The proposed surgical procedure is considered LOW risk.    REVISED CARDIAC RISK INDEX  The patient has the following serious cardiovascular risks for perioperative complications such as (MI, PE, VFib and 3  AV Block):  No serious cardiac risks  INTERPRETATION: 0 risks: Class I (very low risk - 0.4% complication rate)    The patient has the following additional risks for perioperative complications:  No identified additional risks      ICD-10-CM    1. Preop general physical exam Z01.818    2. History of detached retina repair Z98.890     Z86.69    3. Hypothyroidism, unspecified type E03.9    4. Mild asthma exacerbation J45.901    5. Special screening for malignant neoplasms, colon Z12.11 Fecal colorectal cancer screen (FIT)       RECOMMENDATIONS:       Obstructive Sleep Apnea (or suspected sleep apnea)  Hospital staff are advised to monitor for sleep related oxygen desaturations due to suspicion of ZULEIKA      APPROVAL GIVEN to proceed with proposed procedure, without further diagnostic evaluation       Signed Electronically by: MINO Mirza CNP    Copy of this evaluation report is provided to requesting physician.    [facility] Preop Guidelines

## 2018-03-27 NOTE — NURSING NOTE
Due to injection administration, patient instructed to remain in clinic for 15 minutes  afterwards, and to report any adverse reaction to me immediately.  Prior to injection verified patient identity using patient's name and date of birth.

## 2018-03-27 NOTE — MR AVS SNAPSHOT
After Visit Summary   3/27/2018    Luis Eduardo Stone    MRN: [id number]           Patient Information     Date Of Birth          1965        Visit Information        Provider Department      3/27/2018 11:20 AM Shy Guzman APRN CNP [facility]        Today's Diagnoses     Preop general physical exam    -  1    Hypothyroidism, unspecified type        Mild asthma exacerbation          Care Instructions      Before Your Surgery      Call your surgeon if there is any change in your health. This includes signs of a cold or flu (such as a sore throat, runny nose, cough, rash or fever).    Do not smoke, drink alcohol or take over the counter medicine (unless your surgeon or primary care doctor tells you to) for the 24 hours before and after surgery.    If you take prescribed drugs: Follow your doctor s orders about which medicines to take and which to stop until after surgery.    Eating and drinking prior to surgery: follow the instructions from your surgeon    Take a shower or bath the night before surgery. Use the soap your surgeon gave you to gently clean your skin. If you do not have soap from your surgeon, use your regular soap. Do not shave or scrub the surgery site.  Wear clean pajamas and have clean sheets on your bed.           Follow-ups after your visit        Who to contact     If you have questions or need follow up information about today's clinic visit or your schedule please contact [facility] directly at [phone].  Normal or non-critical lab and imaging results will be communicated to you by MyChart, letter or phone within 4 business days after the clinic has received the results. If you do not hear from us within 7 days, please contact the clinic through TRAFIhart or phone. If you have a critical or abnormal lab result, we will notify you by phone as soon as possible.  Submit refill requests through [employer] or call your pharmacy and they will  "forward the refill request to us. Please allow 3 business days for your refill to be completed.          Additional Information About Your Visit        [employer] Information     Iqua gives you secure access to your electronic health record. If you see a primary care provider, you can also send messages to your care team and make appointments. If you have questions, please call your primary care clinic.  If you do not have a primary care provider, please call [phone] and they will assist you.        Care EveryWhere ID     This is your Care EveryWhere ID. This could be used by other organizations to access your [place] medical records  [id number]        Your Vitals Were     Pulse Temperature Height Pulse Oximetry BMI (Body Mass Index)       62 97.1  F (36.2  C) (Tympanic) 5' 7.9\" (1.725 m) 96% 24.7 kg/m2        Blood Pressure from Last 3 Encounters:   03/27/18 110/72   04/19/17 94/52   11/04/16 134/77    Weight from Last 3 Encounters:   03/27/18 162 lb (73.5 kg)   04/19/17 162 lb (73.5 kg)   11/04/16 160 lb (72.6 kg)              Today, you had the following     No orders found for display         Today's Medication Changes          These changes are accurate as of 3/27/18 11:46 AM.  If you have any questions, ask your nurse or doctor.               Stop taking these medicines if you haven't already. Please contact your care team if you have questions.     azithromycin 250 MG tablet   Commonly known as:  ZITHROMAX                    Primary Care Provider Fax #    Provider Not In System [phone]                Equal Access to Services     [facility] : Hadaleksandr Garcia, waaxda luqadaha, qaybta kaalmada vaishnavi hope. So [facility] [phone].    ATENCIÓN: Si habla español, tiene a jose disposición servicios gratuitos de asistencia lingüística. Llame al [phone].    We comply with applicable federal civil rights laws and [place] laws. We do not " discriminate on the basis of race, color, national origin, age, disability, sex, sexual orientation, or gender identity.            Thank you!     Thank you for choosing [facility]  for your care. Our goal is always to provide you with excellent care. Hearing back from our patients is one way we can continue to improve our services. Please take a few minutes to complete the written survey that you may receive in the mail after your visit with us. Thank you!             Your Updated Medication List - Protect others around you: Learn how to safely use, store and throw away your medicines at www.disposemymeds.org.          This list is accurate as of 3/27/18 11:46 AM.  Always use your most recent med list.                   Brand Name Dispense Instructions for use Diagnosis    ALBUTEROL INHALER 17GM      This product no longer available        fluticasone 50 MCG/ACT spray    FLONASE    1 g    Spray 1-2 sprays into both nostrils daily    Chronic cough       indomethacin 25 MG capsule    INDOCIN    60 capsule    Take 1 capsule (25 mg) by mouth 3 times daily as needed for moderate pain    Right knee pain, Swelling of right knee joint       SYNTHROID 125 MCG tablet   Generic drug:  levothyroxine     90 tablet    Take 1 tablet (125 mcg) by mouth daily    Hypothyroidism, unspecified type, Hypothyroidism due to Hashimoto's thyroiditis       thyroid 30 MG tablet    ARMOUR THYROID    90 tablet    Take 1 tablet (30 mg) by mouth daily *MUST BE SEEN FOR FURTHER REFILLS    Hypothyroidism due to Hashimoto's thyroiditis

## 2018-04-05 DIAGNOSIS — E06.3 HYPOTHYROIDISM DUE TO HASHIMOTO'S THYROIDITIS: ICD-10-CM

## 2018-04-06 DIAGNOSIS — E03.9 HYPOTHYROIDISM, UNSPECIFIED TYPE: ICD-10-CM

## 2018-04-06 DIAGNOSIS — E06.3 HYPOTHYROIDISM DUE TO HASHIMOTO'S THYROIDITIS: ICD-10-CM

## 2018-04-06 RX ORDER — THYROID 30 MG/1
30 TABLET ORAL DAILY
Qty: 60 TABLET | Refills: 0 | Status: SHIPPED | OUTPATIENT
Start: 2018-04-06 | End: 2018-07-16

## 2018-04-06 NOTE — TELEPHONE ENCOUNTER
thyroid (ARMOUR THYROID) 30 MG tablet  Last Written Prescription Date:  2/27/18  Last Fill Quantity: 90,   # refills: 0  Last Office Visit : 11/4/16  Future Office visit:  None    Scheduling has been notified to contact the pt for appointment.      Routing refill request to provider for review/approval because:   11/16

## 2018-04-09 RX ORDER — LEVOTHYROXINE SODIUM 125 MCG
125 TABLET ORAL DAILY
Qty: 30 TABLET | Refills: 0 | Status: SHIPPED | OUTPATIENT
Start: 2018-04-09 | End: 2018-05-08

## 2018-04-09 NOTE — TELEPHONE ENCOUNTER
Last Clinic Visit:  11/4/16  [place]:NONE  OVER DUE MD APPT.  30 DAY RF    NO F/U AFTER 90 DAY RF  Scheduling has been notified to contact the pt for appointment.

## 2018-04-16 ENCOUNTER — ALLIED HEALTH/NURSE VISIT (OUTPATIENT)
Dept: FAMILY MEDICINE | Facility: CLINIC | Age: 53
End: 2018-04-16
Payer: COMMERCIAL

## 2018-04-16 DIAGNOSIS — E03.9 HYPOTHYROIDISM, UNSPECIFIED TYPE: ICD-10-CM

## 2018-04-16 DIAGNOSIS — E06.3 HYPOTHYROIDISM DUE TO HASHIMOTO'S THYROIDITIS: ICD-10-CM

## 2018-04-16 DIAGNOSIS — E03.9 HYPOTHYROIDISM, UNSPECIFIED TYPE: Primary | ICD-10-CM

## 2018-04-16 PROCEDURE — 99207 ZZC NO CHARGE NURSE ONLY: CPT

## 2018-04-16 NOTE — NURSING NOTE
Here requesting Amrita Templet to fill his thyroid meds, that endocrine has been doing, as he no longer wishes to go to endo.   See refill request in phone note.   Miguelnia Becerra RNC

## 2018-04-16 NOTE — MR AVS SNAPSHOT
After Visit Summary   4/16/2018    Luis Eduardo Stone    MRN: [id number]           Patient Information     Date Of Birth          1965        Visit Information        Provider Department      4/16/2018 3:30 PM CRUZ HOLBROOK/LILLI BRANTLEY [facility]        Today's Diagnoses     Hypothyroidism, unspecified type    -  1       Follow-ups after your visit        Who to contact     If you have questions or need follow up information about today's clinic visit or your schedule please contact [facility] directly at [phone].  Normal or non-critical lab and imaging results will be communicated to you by [employer], letter or phone within 4 business days after the clinic has received the results. If you do not hear from us within 7 days, please contact the clinic through goodideazst or phone. If you have a critical or abnormal lab result, we will notify you by phone as soon as possible.  Submit refill requests through [employer] or call your pharmacy and they will forward the refill request to us. Please allow 3 business days for your refill to be completed.          Additional Information About Your Visit        MyChart Information     [employer] gives you secure access to your electronic health record. If you see a primary care provider, you can also send messages to your care team and make appointments. If you have questions, please call your primary care clinic.  If you do not have a primary care provider, please call [phone] and they will assist you.        Care EveryWhere ID     This is your Care EveryWhere ID. This could be used by other organizations to access your [place] medical records  [id number]         Blood Pressure from Last 3 Encounters:   03/27/18 110/72   04/19/17 94/52   11/04/16 134/77    Weight from Last 3 Encounters:   03/27/18 162 lb (73.5 kg)   04/19/17 162 lb (73.5 kg)   11/04/16 160 lb (72.6 kg)              Today, you had the following     No orders found for  display       Primary Care Provider Office Phone # Fax #    [facility] [phone] [address] [facility] [address]        Equal Access to Services     SAMI MONTIEL : Hadii aad ku hadmarcyo Sochachoali, waaxda luqadaha, qaybta kaalmada adeegyada, vaishnavi rosalespeace hayesmeryl swainandrae ty. So [facility] [phone].    ATENCIÓN: Si habla español, tiene a jose disposición servicios gratuitos de asistencia lingüística. Llame al [phone].    We comply with applicable federal civil rights laws and [place] laws. We do not discriminate on the basis of race, color, national origin, age, disability, sex, sexual orientation, or gender identity.            Thank you!     Thank you for choosing [facility]  for your care. Our goal is always to provide you with excellent care. Hearing back from our patients is one way we can continue to improve our services. Please take a few minutes to complete the written survey that you may receive in the mail after your visit with us. Thank you!             Your Updated Medication List - Protect others around you: Learn how to safely use, store and throw away your medicines at www.disposemymeds.org.          This list is accurate as of 4/16/18  3:53 PM.  Always use your most recent med list.                   Brand Name Dispense Instructions for use Diagnosis    ALBUTEROL INHALER 17GM      This product no longer available        fluticasone 50 MCG/ACT spray    FLONASE    1 g    Spray 1-2 sprays into both nostrils daily    Chronic cough       indomethacin 25 MG capsule    INDOCIN    60 capsule    Take 1 capsule (25 mg) by mouth 3 times daily as needed for moderate pain    Right knee pain, Swelling of right knee joint       SYNTHROID 125 MCG tablet   Generic drug:  levothyroxine     30 tablet    Take 1 tablet (125 mcg) by mouth daily Call clinic to schedule follow up MD APPT.    Hypothyroidism, unspecified type, Hypothyroidism due to Hashimoto's  thyroiditis       thyroid 30 MG tablet    TOO THYROID    60 tablet    Take 1 tablet (30 mg) by mouth daily *MUST BE SEEN FOR FURTHER REFILLS    Hypothyroidism due to Hashimoto's thyroiditis

## 2018-04-16 NOTE — TELEPHONE ENCOUNTER
"Amrita- patient stopped in today to ask about the Thyroid refills.   \"I spoke with her about it when I was in a couple of weeks ago. I really can't afford to go back to endocrine. \"    Lab Results   Component Value Date    TSH 0.62 12/01/2017    TSH 0.91 11/04/2016     Please call Nik when the Rx's have been refilled.     Miguelina Becerra RNC    "

## 2018-04-18 RX ORDER — LEVOTHYROXINE SODIUM 125 MCG
125 TABLET ORAL DAILY
Qty: 90 TABLET | OUTPATIENT
Start: 2018-04-18

## 2018-04-18 RX ORDER — THYROID 30 MG/1
30 TABLET ORAL DAILY
Qty: 90 TABLET | OUTPATIENT
Start: 2018-04-18

## 2018-04-18 NOTE — TELEPHONE ENCOUNTER
I only saw patient for Preop physical 3/2018 as first appointment   He is on Amour- recommend that patient continue to see Endo

## 2018-04-18 NOTE — TELEPHONE ENCOUNTER
Patient states he prefers not to go back to endo as it is very expensive and he has had multiple eye surgeries so the bills are adding up. He is wondering if Shy saw his lab work from 12/2017 and wondering if he came to see her for a visit if she would then refill? Thank you!    Lupe Kamara RN

## 2018-05-08 DIAGNOSIS — E06.3 HYPOTHYROIDISM DUE TO HASHIMOTO'S THYROIDITIS: ICD-10-CM

## 2018-05-08 DIAGNOSIS — E03.9 HYPOTHYROIDISM, UNSPECIFIED TYPE: ICD-10-CM

## 2018-05-09 RX ORDER — LEVOTHYROXINE SODIUM 125 MCG
125 TABLET ORAL DAILY
Qty: 30 TABLET | Refills: 1 | Status: SHIPPED | OUTPATIENT
Start: 2018-05-09 | End: 2018-07-16

## 2018-05-09 NOTE — TELEPHONE ENCOUNTER
Last Clinic Visit: 11/4/2016  [facility] Endocrinology  Future Visit: 6/29/18   Teri refill sent qty thru pending appt.

## 2018-05-21 ENCOUNTER — TELEPHONE (OUTPATIENT)
Dept: FAMILY MEDICINE | Facility: CLINIC | Age: 53
End: 2018-05-21

## 2018-05-21 NOTE — TELEPHONE ENCOUNTER
Pt is due for a FIT test and an ACT. Pt was already spoken to regarding a FIT test so an ACT has been sent to pt with instructions on mailing it back after completion.    Gemma Augustin CMA        Panel Management Review      Patient has the following on his problem list:     Asthma review     ACT Total Scores 4/8/2016   ACT TOTAL SCORE -   ASTHMA ER VISITS -   ASTHMA HOSPITALIZATIONS -   ACT TOTAL SCORE (Goal Greater than or Equal to 20) 24   In the past 12 months, how many times did you visit the emergency room for your asthma without being admitted to the hospital? 0   In the past 12 months, how many times were you hospitalized overnight because of your asthma? 0      1. Is Asthma diagnosis on the Problem List? Yes    2. Is Asthma listed on Health Maintenance? Yes    3. Patient is due for:  ACT and AAP      Composite cancer screening  Chart review shows that this patient is due/due soon for the following Fecal Colorectal (FIT)  Summary:    Patient is due/failing the following:   AAP, ACT and FIT    Action needed:   Patient needs to do ACT.    Type of outreach:    Sent letter.    Questions for provider review:    None                                                                                                                                    Gemma Augustin CMA       Chart routed to NONE .

## 2018-05-21 NOTE — LETTER
[facility]  [address]  [facility] [place] [address]  [phone]        May 21, 2018  Luis Eduardo Stone  [address] [address]      Dear Luis Eduardo,      I care about your health and have reviewed your health plan. I have reviewed your medical conditions, medication list, and lab results and am making recommendations based on this review, to better manage your health.    You are in particular need of attention regarding:  -Asthma    I am recommending that you:   -Complete and return the attached ASTHMA CONTROL TEST.  If your total score is 19 or less or you have been to the ER or urgent care for your asthma, then please schedule an asthma followup appointment.    Here is a list of Health Maintenance topics that are due now or due soon:  Health Maintenance Due   Topic Date Due     FIT Q1 YR  02/08/1976     HIV SCREEN (SYSTEM ASSIGNED)  02/08/1984     ASTHMA CONTROL TEST Q6 MOS  10/08/2016     ASTHMA ACTION PLAN Q1 YR  04/08/2017                               Please call us at [phone] (or use Inf[employer]) to address the above recommendations.       Thank you for trusting [facility] and we appreciate the opportunity to serve you.  We look forward to supporting your healthcare needs in the future.          Healthy Regards,      Shy Guzman NP

## 2018-06-14 ENCOUNTER — TELEPHONE (OUTPATIENT)
Dept: FAMILY MEDICINE | Facility: CLINIC | Age: 53
End: 2018-06-14

## 2018-06-15 NOTE — TELEPHONE ENCOUNTER
[facility] Pre-op form request received and routed to RN for review.  I believe patient's Pre-op which was 3/27/18 is too long ago for his surgery which is scheduled for 6/19/18.

## 2018-06-15 NOTE — TELEPHONE ENCOUNTER
Amrita,    I have called the patient and informed him that H and P's need to be done within 30 days of surgery.  Patient states his doctor is the head of [place] [facility] and is trying to get this procedure approved that no H and P is required.  Per patient this procedure takes a minute to do with laser.  Understandable but we have not seen him in 3 months.  Patient is informed this is not our standard of care and our policy but will forward to his provider. Form placed on Shy Guzman's desk for consideration. Elina MORAES RN

## 2018-06-15 NOTE — TELEPHONE ENCOUNTER
"Pt left voicemail for this supervisor to complain about pre-op process. I spoke with patient for over 20 minutes, explained our policy and guidelines that a pre-op needs to be done within 30 days of surgery/procedure. Pt continues same argument as noted below, that this is a one minute procedure and he shouldn't need to be seen again and his eye surgeon said we could \"just sign off\" on this pre-op. I explained the liability of \"just signing off\" on a pre-op would lay on our shoulders, not his eye surgeons' so it is easy for the eye surgeon to make that recommendation without holding any of the responsibility to consequences.     Pt continues to argue that [facility] is just trying to make more money. I again explained the guidelines are in place for patient safety and to ensure a good outcome for all patients undergoing any procedure/surgery. I recommended the patient call his eye surgeon and ask if they can use the pre-op from 3 months ago.     Pt is dissatisfied with recommendations. Call is ended.    Clarisa Garsia, RN, BSN  Supervisor of Patient Care  [facility]  Primary Care [facility]    "

## 2018-06-18 NOTE — TELEPHONE ENCOUNTER
I last saw patient on 3/27/18- he would need a new Pre-op since they are only good for 30 days.  Patient would need to schedule an appointment for a pre-op since his last pre-op was roughly 3 months ago (90 days)

## 2018-06-18 NOTE — TELEPHONE ENCOUNTER
Patient calling back requesting status of pre-op form placed on Amrita Guzman's desk. Any update on this form? Patient states he needs a call back within 30 minutes. If form is not complete, can Clarisa Garsia call back with an updated?    Sarah Olson   [facility] [place]

## 2018-06-18 NOTE — TELEPHONE ENCOUNTER
Patient notified of provider's recommendations regarding pre-op  Patient verbalized understanding, he was not happy about it    Kassie MARTINEZ Rn

## 2018-07-16 DIAGNOSIS — E03.9 HYPOTHYROIDISM, UNSPECIFIED TYPE: ICD-10-CM

## 2018-07-16 DIAGNOSIS — E06.3 HYPOTHYROIDISM DUE TO HASHIMOTO'S THYROIDITIS: ICD-10-CM

## 2018-07-16 RX ORDER — THYROID 30 MG/1
TABLET ORAL
Qty: 30 TABLET | Refills: 1 | Status: SHIPPED | OUTPATIENT
Start: 2018-07-16 | End: 2018-09-21

## 2018-07-16 RX ORDER — LEVOTHYROXINE SODIUM 125 MCG
125 TABLET ORAL DAILY
Qty: 30 TABLET | Refills: 1 | Status: SHIPPED | OUTPATIENT
Start: 2018-07-16 | End: 2018-09-21

## 2018-07-16 NOTE — TELEPHONE ENCOUNTER
synthroid  Last Written Prescription Date:  5/9/18  Last Fill Quantity: 30,   # refills: 1  armour      Last Written Prescription Date:  4/6/18  Last Fill Quantity: 60,   # refills: 0  Last Office Visit : 11/4/16  Future Office visit:  9/21/18    Routing refill request to provider for review/approval because:  Last appt 2016    pt is out of the medications

## 2018-09-14 DIAGNOSIS — E03.9 HYPOTHYROIDISM, UNSPECIFIED TYPE: ICD-10-CM

## 2018-09-14 DIAGNOSIS — E06.3 HYPOTHYROIDISM DUE TO HASHIMOTO'S THYROIDITIS: ICD-10-CM

## 2018-09-14 RX ORDER — LEVOTHYROXINE SODIUM 125 MCG
125 TABLET ORAL DAILY
Qty: 30 TABLET | Refills: 1 | Status: CANCELLED | OUTPATIENT
Start: 2018-09-14

## 2018-09-21 ENCOUNTER — OFFICE VISIT (OUTPATIENT)
Dept: ENDOCRINOLOGY | Facility: CLINIC | Age: 53
End: 2018-09-21
Payer: COMMERCIAL

## 2018-09-21 VITALS
SYSTOLIC BLOOD PRESSURE: 134 MMHG | BODY MASS INDEX: 24.48 KG/M2 | WEIGHT: 160.5 LBS | DIASTOLIC BLOOD PRESSURE: 65 MMHG | HEART RATE: 74 BPM

## 2018-09-21 DIAGNOSIS — E03.9 HYPOTHYROIDISM, UNSPECIFIED TYPE: Primary | ICD-10-CM

## 2018-09-21 DIAGNOSIS — E06.3 HYPOTHYROIDISM DUE TO HASHIMOTO'S THYROIDITIS: ICD-10-CM

## 2018-09-21 DIAGNOSIS — E03.9 HYPOTHYROIDISM, UNSPECIFIED TYPE: ICD-10-CM

## 2018-09-21 LAB
T3 SERPL-MCNC: 96 NG/DL (ref 60–181)
T4 FREE SERPL-MCNC: 1 NG/DL (ref 0.76–1.46)
TSH SERPL DL<=0.005 MIU/L-ACNC: 1.83 MU/L (ref 0.4–4)

## 2018-09-21 RX ORDER — LEVOTHYROXINE SODIUM 125 MCG
125 TABLET ORAL DAILY
Qty: 90 TABLET | Refills: 3 | Status: SHIPPED | OUTPATIENT
Start: 2018-09-21 | End: 2019-09-24

## 2018-09-21 ASSESSMENT — PAIN SCALES - GENERAL: PAINLEVEL: MODERATE PAIN (4)

## 2018-09-21 NOTE — PROGRESS NOTES
Endocrinology Note         Luis Eduardo is a 53 year old male presents today for hypothyroidism.    HPI  Luis Eduardo Stone is a 53 years old male with hx of hypothyroidism who is here for follow up hypothyroidism. Last seen 11/2016.    He was diagnosed with hypothyroidism in 2001 during routine physical exam. He was on combination of Synthroid 100 mcg and [place] thyroid 30 mg (T4 50 mcg +T3 12.5 mcg) daily from 9848-3060. Due to shortage of [place] thyroid, he was switched to Synthroid 150 mcg alternating with 175 mcg  every other day but did not like it and wanted to use combination of Synthroid and [place] because he felt better.     Interim history:  I adjusted his thyroid medication. He is currently on Synthroid 125 mcg and NP thyroid 30 mg (T4 50 mcg +T3 12.5 mcg) over the past 2-3 years. He reported that he feels better with combination. Mood is stable. He denied chest pain, shortness of breath, palpitation, abdominal pain, diarrhea/constipation, polyuria/polydipsia, fatigue, tremor, weight change, headache, dizziness and insomnia. 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 His eyes are always a little bulging out.     Past Medical History  Hypothyroidism    Allergies  Allergies   Allergen Reactions     Apple GI Disturbance     Penicillins Rash     Medications  Current Outpatient Prescriptions   Medication Sig Dispense Refill     ALBUTEROL INHALER 17GM This product no longer available       fluticasone (FLONASE) 50 MCG/ACT nasal spray Spray 1-2 sprays into both nostrils daily (Patient not taking: Reported on 4/19/2017) 1 g 11     indomethacin (INDOCIN) 25 MG capsule Take 1 capsule (25 mg) by mouth 3 times daily as needed for moderate pain (Patient not taking: Reported on 4/19/2017) 60 capsule 1     SYNTHROID 125 MCG tablet Take 1 tablet (125 mcg) by mouth daily 30 tablet 1     Thyroid (LEVOTHYROXINE-LIOTHYRONINE) 30 MG TABS Take 30 mg by mouth       [DISCONTINUED]  thyroid (ARMOUR THYROID) 30 MG tablet 1 tab daily 30 tablet 1     Family History  No thyroid disease in the family    Social History  Social History   Substance Use Topics     Smoking status: Never Smoker     Smokeless tobacco: Never Used     Alcohol use Yes      Comment: social   works for apartment management  Drinks alcohol occasionally    ROS  Constitutional: stable weight, still has some low energy.  Eyes: no vision change, diplopia or red eyes   Neck: no difficulty swallowing, no choking, no neck pain, no neck swelling  Cardiovascular: no chest pain, palpitations  Respiratory: no dyspnea, cough, shortness of breath or wheezing   GI: no nausea, vomiting, diarrhea or constipation, no abdominal pain   : no change in urine, no dysuria or hematuria  Musculoskeletal: no joint or muscle pain or swelling   Integumentary: no concerning lesions   Neuro: no loss of strength or sensation, no numbness or tingling, no tremor, no dizziness, no headache   Endo: no polyuria or polydipsia,tend to feel warm intolerance   Heme/Lymph: no concerning bumps, no bleeding problems   Allergy: no environmental allergies   Psych: sleep ok    Physical Exam  /65  Pulse 74  Wt 72.8 kg (160 lb 8 oz)  BMI 24.48 kg/m2  Body mass index is 24.48 kg/(m^2).  Constitutional: no distress, comfortable, pleasant   Eyes: anicteric, normal extra-ocular movements, +lid lag and lid retraction  Neck: no thyromegaly  Cardiovascular: regular rate and rhythm, normal S1 and S2, no murmur  Respiratory: clear to auscultation, no wheezes or crackles, normal breath sounds   Gastrointestinal:   nontender, no hepatomegaly, no masses   Musculoskeletal: no edema   Skin: no concerning lesions, no jaundice   Neurological: cranial nerves intact, 2+ reflexes at patella, normal gait, no tremor on outstretched hands bilaterally  Psychological: appropriate mood   Lymphatic: no cervical  lymphadenopathy.      RESULTS       ASSESSMENT:    Luis Eduardo Stone is a 53  years old male with hx of hypothyroidism who is here for follow up hypothyroidism    1) Hypothyroidism: diagnosed in 2001. He was previously on combination of Synthroid and [place] thyroid until 2008. He is feeling well on this combination, Synthroid 125 mcg and NP thyroid 30 mg. He is clinically euthyroid. I will repeat lab today and adjust dose accordingly. All questions answered.    PLAN:   - check TSH, FT4, TT3   - will adjust dose and refill accordingly.  - He can follow up with PCP in 1 year for annual thyroid test    Dany Erazo MD  Endocrinology  [phone]

## 2018-09-21 NOTE — MR AVS SNAPSHOT
After Visit Summary   9/21/2018    Luis Eduardo Stone    MRN: [id number]           Patient Information     Date Of Birth          1965        Visit Information        Provider Department      9/21/2018 11:20 AM Dany Erazo MD [facility] Endocrinology        Today's Diagnoses     Hypothyroidism, unspecified type    -  1    Hypothyroidism due to Hashimoto's thyroiditis           Follow-ups after your visit        Your next 10 appointments already scheduled     Oct 11, 2018  3:00 PM CDT   New Visit with Gilbert Dudley DO   [facility] ([facility])    [address]  [address]  [facility] [place] [address] [phone]              Who to contact     Please call your clinic at [phone] to:    Ask questions about your health    Make or cancel appointments    Discuss your medicines    Learn about your test results    Speak to your doctor            Additional Information About Your Visit        MyChart Information     [email or web address] gives you secure access to your electronic health record. If you see a primary care provider, you can also send messages to your care team and make appointments. If you have questions, please call your primary care clinic.  If you do not have a primary care provider, please call [phone] and they will assist you.      [email or web address] is an electronic gateway that provides easy, online access to your medical records. With [email or web address], you can request a clinic appointment, read your test results, renew a prescription or communicate with your care team.     To access your existing account, please contact your [facility] or call [phone] for assistance.        Care EveryWhere ID     This is your Care EveryWhere ID. This could be used by other organizations to access your [place] medical records  [id number]        Your Vitals Were     Pulse BMI (Body Mass Index)                74 24.48  kg/m2           Blood Pressure from Last 3 Encounters:   09/21/18 134/65   03/27/18 110/72   04/19/17 94/52    Weight from Last 3 Encounters:   09/21/18 72.8 kg (160 lb 8 oz)   03/27/18 73.5 kg (162 lb)   04/19/17 73.5 kg (162 lb)                 Today's Medication Changes          These changes are accurate as of 9/21/18 11:59 PM.  If you have any questions, ask your nurse or doctor.               These medicines have changed or have updated prescriptions.        Dose/Directions    Levothyroxine-Liothyronine 30 MG Tabs   This may have changed:    - when to take this  - Another medication with the same name was removed. Continue taking this medication, and follow the directions you see here.   Used for:  Hypothyroidism, unspecified type   Changed by:  Dany Erazo MD        Dose:  30 mg   Take 30 mg by mouth daily   Quantity:  90 tablet   Refills:  3            Where to get your medicines      These medications were sent to [facility] [address] [place] [address] AT [address] [address]    Hours:  24-hours Phone:  [phone]     Levothyroxine-Liothyronine 30 MG Tabs    SYNTHROID 125 MCG tablet                Primary Care Provider Office Phone # Fax #    MINO [facility] [employer] [employer] [facility] [phone] [address] [address]        Equal Access to Services     SAMI MONTIEL AH: Hadii brady woodo Sodemetrio, waaxda luqadaha, qaybta kaalmada aderickeyda, vaishnavi ty. So [facility] [phone].    ATENCIÓN: Si habla español, tiene a jose disposición servicios gratuitos de asistencia lingüística. Llame al [phone].    We comply with applicable federal civil rights laws and [place] laws. We do not discriminate on the basis of race, color, national origin, age, disability, sex, sexual orientation, or gender identity.            Thank you!     Thank you for choosing [facility] ENDOCRINOLOGY  for your care. Our  goal is always to provide you with excellent care. Hearing back from our patients is one way we can continue to improve our services. Please take a few minutes to complete the written survey that you may receive in the mail after your visit with us. Thank you!             Your Updated Medication List - Protect others around you: Learn how to safely use, store and throw away your medicines at www.disposemymeds.org.          This list is accurate as of 9/21/18 11:59 PM.  Always use your most recent med list.                   Brand Name Dispense Instructions for use Diagnosis    ALBUTEROL INHALER 17GM      This product no longer available        fluticasone 50 MCG/ACT spray    FLONASE    1 g    Spray 1-2 sprays into both nostrils daily    Chronic cough       indomethacin 25 MG capsule    INDOCIN    60 capsule    Take 1 capsule (25 mg) by mouth 3 times daily as needed for moderate pain    Right knee pain, Swelling of right knee joint       Levothyroxine-Liothyronine 30 MG Tabs     90 tablet    Take 30 mg by mouth daily    Hypothyroidism, unspecified type       SYNTHROID 125 MCG tablet   Generic drug:  levothyroxine     90 tablet    Take 1 tablet (125 mcg) by mouth daily    Hypothyroidism due to Hashimoto's thyroiditis, Hypothyroidism, unspecified type

## 2018-09-21 NOTE — LETTER
9/21/2018       RE: Luis Eduardo Stone  [address] [address]     Dear Colleague,    Thank you for referring your patient, Luis Eduardo Stone, to the [facility] ENDOCRINOLOGY at [facility]. Please see a copy of my visit note below.         Endocrinology Note         Luis Eduardo is a 53 year old male presents today for hypothyroidism.    HPI  Luis Eduardo Stone is a 53 years old male with hx of hypothyroidism who is here for follow up hypothyroidism. Last seen 11/2016.    He was diagnosed with hypothyroidism in 2001 during routine physical exam. He was on combination of Synthroid 100 mcg and Unity thyroid 30 mg (T4 50 mcg +T3 12.5 mcg) daily from 2003-3008. Due to shortage of Unity thyroid, he was switched to Synthroid 150 mcg alternating with 175 mcg  every other day but did not like it and wanted to use combination of Synthroid and Unity because he felt better.     Interim history:  I adjusted his thyroid medication. He is currently on Synthroid 125 mcg and NP thyroid 30 mg (T4 50 mcg +T3 12.5 mcg) over the past 2-3 years. He reported that he feels better with combination. Mood is stable. He denied chest pain, shortness of breath, palpitation, abdominal pain, diarrhea/constipation, polyuria/polydipsia, fatigue, tremor, weight change, headache, dizziness and insomnia. 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 His eyes are always a little bulging out.     Past Medical History  Hypothyroidism    Allergies  Allergies   Allergen Reactions     Apple GI Disturbance     Penicillins Rash     Medications  Current Outpatient Prescriptions   Medication Sig Dispense Refill     ALBUTEROL INHALER 17GM This product no longer available       fluticasone (FLONASE) 50 MCG/ACT nasal spray Spray 1-2 sprays into both nostrils daily (Patient not taking: Reported on 4/19/2017) 1 g 11     indomethacin (INDOCIN) 25 MG capsule Take 1  capsule (25 mg) by mouth 3 times daily as needed for moderate pain (Patient not taking: Reported on 4/19/2017) 60 capsule 1     SYNTHROID 125 MCG tablet Take 1 tablet (125 mcg) by mouth daily 30 tablet 1     Thyroid (LEVOTHYROXINE-LIOTHYRONINE) 30 MG TABS Take 30 mg by mouth       [DISCONTINUED] thyroid (TOO THYROID) 30 MG tablet 1 tab daily 30 tablet 1     Family History  No thyroid disease in the family    Social History  Social History   Substance Use Topics     Smoking status: Never Smoker     Smokeless tobacco: Never Used     Alcohol use Yes      Comment: social   works for [employer]  Drinks alcohol occasionally    ROS  Constitutional: stable weight, still has some low energy.  Eyes: no vision change, diplopia or red eyes   Neck: no difficulty swallowing, no choking, no neck pain, no neck swelling  Cardiovascular: no chest pain, palpitations  Respiratory: no dyspnea, cough, shortness of breath or wheezing   GI: no nausea, vomiting, diarrhea or constipation, no abdominal pain   : no change in urine, no dysuria or hematuria  Musculoskeletal: no joint or muscle pain or swelling   Integumentary: no concerning lesions   Neuro: no loss of strength or sensation, no numbness or tingling, no tremor, no dizziness, no headache   Endo: no polyuria or polydipsia,tend to feel warm intolerance   Heme/Lymph: no concerning bumps, no bleeding problems   Allergy: no environmental allergies   Psych: sleep ok    Physical Exam  /65  Pulse 74  Wt 72.8 kg (160 lb 8 oz)  BMI 24.48 kg/m2  Body mass index is 24.48 kg/(m^2).  Constitutional: no distress, comfortable, pleasant   Eyes: anicteric, normal extra-ocular movements, +lid lag and lid retraction  Neck: no thyromegaly  Cardiovascular: regular rate and rhythm, normal S1 and S2, no murmur  Respiratory: clear to auscultation, no wheezes or crackles, normal breath sounds   Gastrointestinal:   nontender, no hepatomegaly, no masses   Musculoskeletal: no edema    Skin: no concerning lesions, no jaundice   Neurological: cranial nerves intact, 2+ reflexes at patella, normal gait, no tremor on outstretched hands bilaterally  Psychological: appropriate mood   Lymphatic: no cervical  lymphadenopathy.      RESULTS       ASSESSMENT:    Luis Eduardo Stone is a 53 years old male with hx of hypothyroidism who is here for follow up hypothyroidism    1) Hypothyroidism: diagnosed in 2001. He was previously on combination of Synthroid and Meade thyroid until 2008. He is feeling well on this combination, Synthroid 125 mcg and NP thyroid 30 mg. He is clinically euthyroid. I will repeat lab today and adjust dose accordingly. All questions answered.    PLAN:   - check TSH, FT4, TT3   - will adjust dose and refill accordingly.  - He can follow up with PCP in 1 year for annual thyroid test    Dayn Erazo MD  Endocrinology  [phone]

## 2018-09-24 RX ORDER — THYROID 30 MG/1
TABLET ORAL
Qty: 30 TABLET | Refills: 1 | OUTPATIENT
Start: 2018-09-24

## 2018-10-11 ENCOUNTER — OFFICE VISIT (OUTPATIENT)
Dept: ORTHOPEDICS | Facility: CLINIC | Age: 53
End: 2018-10-11
Payer: COMMERCIAL

## 2018-10-11 VITALS — SYSTOLIC BLOOD PRESSURE: 128 MMHG | DIASTOLIC BLOOD PRESSURE: 76 MMHG

## 2018-10-11 DIAGNOSIS — M25.561 CHRONIC PAIN OF RIGHT KNEE: ICD-10-CM

## 2018-10-11 DIAGNOSIS — G89.29 CHRONIC PAIN OF RIGHT KNEE: ICD-10-CM

## 2018-10-11 DIAGNOSIS — M17.11 PRIMARY OSTEOARTHRITIS OF RIGHT KNEE: Primary | ICD-10-CM

## 2018-10-11 PROCEDURE — 99213 OFFICE O/P EST LOW 20 MIN: CPT | Mod: 25 | Performed by: FAMILY MEDICINE

## 2018-10-11 PROCEDURE — 20611 DRAIN/INJ JOINT/BURSA W/US: CPT | Mod: RT | Performed by: FAMILY MEDICINE

## 2018-10-11 NOTE — LETTER
10/11/2018         RE: Luis Eduardo Stone  [address] [address]        Dear Colleague,    Thank you for referring your patient, Luis Eduardo Stone, to the [facility]. Please see a copy of my visit note below.    Luis Eduardo Stone  :  1965  DOS: 10/11/18  MRN: [id number]    Sports Medicine Clinic Visit    PCP: No primary care provider on file.    Luis Eduardo Stone is a 50 year old male who is seen in consultation at the request of  MINO Davis presenting with right knee pain.    Injury: Insidious onset of pain over last 5 week(s) (9/1/15).  Pain located over right deep anterior knee, nonradiating.  Additional Features:  Positive: intermittent swelling and grinding.  Symptoms are better with Ibuprofen and Rest.  Symptoms are worse with: stairs and walking, going from sit to stand position.  Other evaluation and/or treatments so far consists of: Ibuprofen, Rest and PCP visit.  Recent imaging completed: X-rays completed 10/6/15.  Prior History of related problems: none    Social History: works as      Interim History - 2016  Since last visit on 10/8/15 patient has worsening right knee pain.  Steroid injection last completed on 16 by Dr Aquino provided good relief for 2 months, but not as long as previous.  He would like discuss/pursue Synvisc injection today.  No new injury in the interim.    Interim History - 2018  Since last visit on 16 patient has moderate right knee pain.  Right knee synviscOne injection completed on  provided good relief for ~ 1 year.  He would like to discuss repeat synvisc vs PRP injection.  No new injury in the interim.    Review of Systems  Musculoskeletal: as above  Remainder of review of systems is negative including constitutional, CV, pulmonary, GI, Skin and Neurologic except as noted in HPI or medical history.    No past medical history on  "file.  Past Surgical History:   Procedure Laterality Date     ORTHOPEDIC SURGERY  1979    right thumb repair     Objective  /76  Ht (P) 5' 7.9\" (1.725 m)  Wt (P) 152 lb (68.9 kg)  BMI (P) 23.18 kg/m2    GENERAL APPEARANCE: healthy, alert and no distress   GAIT: NORMAL and antalgic  SKIN: no suspicious lesions or rashes  NEURO: Normal strength and tone, mentation intact and speech normal  PSYCH:  mentation appears normal and affect normal/bright    Right Knee exam minimal change    ROM:        Flexion 120  degrees       Extension -2 degrees       Range of motion limited by pain, effusion    Inspection:       no visible ecchymosis        effusion noted moderate    Skin:       no visible deformities       well perfused       capillary refill brisk    Patellar Motion:        Lateral tilt noted in patella       Crepitus noted in the patellofemoral joint    Tender:        medial patellar border       lateral patellar border       medial joint line       pes anserine bursa    Non Tender:         remainder of knee area        lateral joint line        along MCL        distal IT Band        infrapatellar tendon        tibial tubercle    Special Tests:        positive (+) Pat       neg (-) Lachmans       neg (-) anterior drawer       neg (-) posterior drawer       neg (-) varus at 0 deg and 30 deg       neg (-) valgus at 0 deg and 30 deg    Evaluation of ipsilateral kinetic chain       decreased strength with resisted abduction of the right hip       decreased strength with resisted extension of the right hip       Decreased quad tone on the right    Large Joint Injection/Arthocentesis  Date/Time: 10/11/2018 3:50 PM  Performed by: BRYANNA POLLACK  Authorized by: BRYANNA POLLACK     Indications:  Osteoarthritis  Guidance: ultrasound    Approach:  Superolateral  Location:  Knee  Site:  R knee joint  Medications:  48 mg hylan 48 MG/6ML  Aspirate amount (mL):  4  Aspirate:  Serous and " yellow  Outcome:  Tolerated well, no immediate complications  Consent Given by:  Patient  Prep: patient was prepped and draped in usual sterile fashion          Radiology  RIGHT KNEE STANDING TWO VIEWS 10/6/2015 1:47 PM   HISTORY: Pain in right knee.  COMPARISON: None.  IMPRESSION  IMPRESSION: Moderate to severe joint space narrowing in the right  medial knee compartment with mild osteophyte formation overlying both  lateral and medial knee compartments. Patellofemoral articulation  intact on the right. Calcification is noted posterior to the distal  femur measuring up to 1.2 cm. This is posterior to expected location  of fabella, or joint space. This may be within the soft tissue, though  etiology unknown. No significant knee effusion. Small patellar  osteophytes. No acute fracture. Slight cortical irregularity in the  proximal to mid left fibular diaphysis. This may be artifactual,  though limited visualization.    Assessment:  1. Primary osteoarthritis of right knee    2. Chronic pain of right knee        Plan:  Discussed the assessment with the patient.  Follow up: 4-6 weeks if not improving significantly  Repeat aspiration and synvisc injection done today under ultrasound guidance  Discussed range of conservative options including appropriate use of injection therapy consider Visco Supplementation in the future if not significant help from steroid injection  MRI and further or the referral can also be considered in the future given his intermittent swelling episodes  Likely the only surgical option would be total arthroplasty, we'll defer that discussion to the surgeon, however he is quite young to consider this and so we'll try to continue to manage conservatively for now  Expectations and limitations of synvisc were reviewed in detail  Often 4-6 weeks before full effect may be noticed  Usually covered up to every 6 months by insurance, but does not need to be repeated unless pain returns, at which point we  would re-evaluate  Potential use of CSI in future for flares of pain reviewed in detail  Encouraged modified progressive pain-free activity as tolerated  HEP and Supportive care reviewed  All questions were answered today  Contact us with additional questions or concerns  Signs and sx of concern reviewed      Gilbert Dudley DO, JUSTYN  Primary Care Sports Medicine  [facility]           Disclaimer: This note consists of symbols derived from keyboarding, dictation and/or voice recognition software. As a result, there may be errors in the script that have gone undetected. Please consider this when interpreting information found in this chart.      Again, thank you for allowing me to participate in the care of your patient.        Sincerely,        Gilbert Dudley DO

## 2018-10-11 NOTE — MR AVS SNAPSHOT
After Visit Summary   10/11/2018    Luis Eduardo Stone    MRN: [id number]           Patient Information     Date Of Birth          1965        Visit Information        Provider Department      10/11/2018 3:00 PM Gilbert Dudley DO [facility] Orthopedic [facility]        Today's Diagnoses     Primary osteoarthritis of right knee    -  1    Chronic pain of right knee           Follow-ups after your visit        Who to contact     If you have questions or need follow up information about today's clinic visit or your schedule please contact [facility] directly at [phone].  Normal or non-critical lab and imaging results will be communicated to you by [employer]art, letter or phone within 4 business days after the clinic has received the results. If you do not hear from us within 7 days, please contact the clinic through [facility] or phone. If you have a critical or abnormal lab result, we will notify you by phone as soon as possible.  Submit refill requests through [employer] or call your pharmacy and they will forward the refill request to us. Please allow 3 business days for your refill to be completed.          Additional Information About Your Visit        MyChart Information     [employer] gives you secure access to your electronic health record. If you see a primary care provider, you can also send messages to your care team and make appointments. If you have questions, please call your primary care clinic.  If you do not have a primary care provider, please call [phone] and they will assist you.        Care EveryWhere ID     This is your Care EveryWhere ID. This could be used by other organizations to access your [place] medical records  [id number]         Blood Pressure from Last 3 Encounters:   10/11/18 128/76   09/21/18 134/65   03/27/18 110/72    Weight from Last 3 Encounters:   10/11/18 (P) 152 lb (68.9 kg)   09/21/18 160 lb 8 oz (72.8  kg)   03/27/18 162 lb (73.5 kg)              We Performed the Following     Large Joint Injection/Arthocentesis        Primary Care Provider Office Phone # Fax #    MINO [facility] [phone] [address] [facility] [address]        Equal Access to Services     SAMI MONTIEL : Hadii aad ku hadasho Soomaali, waaxda luqadaha, qaybta kaalmada adeegyada, waxay idiin hayaan ademeryl khangellash lamaria del carmen . So [facility] [phone].    ATENCIÓN: Si habla español, tiene a jose disposición servicios gratuitos de asistencia lingüística. Pascual al [phone].    We comply with applicable federal civil rights laws and [place] laws. We do not discriminate on the basis of race, color, national origin, age, disability, sex, sexual orientation, or gender identity.            Thank you!     Thank you for choosing [facility]THOPEDIC [facility]  for your care. Our goal is always to provide you with excellent care. Hearing back from our patients is one way we can continue to improve our services. Please take a few minutes to complete the written survey that you may receive in the mail after your visit with us. Thank you!             Your Updated Medication List - Protect others around you: Learn how to safely use, store and throw away your medicines at www.disposemymeds.org.          This list is accurate as of 10/11/18 11:59 PM.  Always use your most recent med list.                   Brand Name Dispense Instructions for use Diagnosis    ALBUTEROL INHALER 17GM      This product no longer available        fluticasone 50 MCG/ACT spray    FLONASE    1 g    Spray 1-2 sprays into both nostrils daily    Chronic cough       indomethacin 25 MG capsule    INDOCIN    60 capsule    Take 1 capsule (25 mg) by mouth 3 times daily as needed for moderate pain    Right knee pain, Swelling of right knee joint       Levothyroxine-Liothyronine 30 MG Tabs     90 tablet    Take 30 mg by mouth daily    Hypothyroidism,  unspecified type       SYNTHROID 125 MCG tablet   Generic drug:  levothyroxine     90 tablet    Take 1 tablet (125 mcg) by mouth daily    Hypothyroidism due to Hashimoto's thyroiditis, Hypothyroidism, unspecified type

## 2018-10-11 NOTE — PROGRESS NOTES
"Luis Eduardo Stone  :  1965  DOS: 10/11/18  MRN: [id number]    Sports Medicine Clinic Visit    PCP: No primary care provider on file.    Luis Eduardo Stone is a 50 year old male who is seen in consultation at the request of  MINO Davis presenting with right knee pain.    Injury: Insidious onset of pain over last 5 week(s) (9/1/15).  Pain located over right deep anterior knee, nonradiating.  Additional Features:  Positive: intermittent swelling and grinding.  Symptoms are better with Ibuprofen and Rest.  Symptoms are worse with: stairs and walking, going from sit to stand position.  Other evaluation and/or treatments so far consists of: Ibuprofen, Rest and PCP visit.  Recent imaging completed: X-rays completed 10/6/15.  Prior History of related problems: none    Social History: works as      Interim History - 2016  Since last visit on 10/8/15 patient has worsening right knee pain.  Steroid injection last completed on 16 by Dr Aquino provided good relief for 2 months, but not as long as previous.  He would like discuss/pursue Synvisc injection today.  No new injury in the interim.    Interim History - 2018  Since last visit on 16 patient has moderate right knee pain.  Right knee synviscOne injection completed on  provided good relief for ~ 1 year.  He would like to discuss repeat synvisc vs PRP injection.  No new injury in the interim.    Review of Systems  Musculoskeletal: as above  Remainder of review of systems is negative including constitutional, CV, pulmonary, GI, Skin and Neurologic except as noted in HPI or medical history.    No past medical history on file.  Past Surgical History:   Procedure Laterality Date     ORTHOPEDIC SURGERY      right thumb repair     Objective  /76  Ht (P) 5' 7.9\" (1.725 m)  Wt (P) 152 lb (68.9 kg)  BMI (P) 23.18 kg/m2    GENERAL APPEARANCE: healthy, alert and no distress   GAIT: NORMAL and " antalgic  SKIN: no suspicious lesions or rashes  NEURO: Normal strength and tone, mentation intact and speech normal  PSYCH:  mentation appears normal and affect normal/bright    Right Knee exam minimal change    ROM:        Flexion 120  degrees       Extension -2 degrees       Range of motion limited by pain, effusion    Inspection:       no visible ecchymosis        effusion noted moderate    Skin:       no visible deformities       well perfused       capillary refill brisk    Patellar Motion:        Lateral tilt noted in patella       Crepitus noted in the patellofemoral joint    Tender:        medial patellar border       lateral patellar border       medial joint line       pes anserine bursa    Non Tender:         remainder of knee area        lateral joint line        along MCL        distal IT Band        infrapatellar tendon        tibial tubercle    Special Tests:        positive (+) Pat       neg (-) Lachmans       neg (-) anterior drawer       neg (-) posterior drawer       neg (-) varus at 0 deg and 30 deg       neg (-) valgus at 0 deg and 30 deg    Evaluation of ipsilateral kinetic chain       decreased strength with resisted abduction of the right hip       decreased strength with resisted extension of the right hip       Decreased quad tone on the right    Large Joint Injection/Arthocentesis  Date/Time: 10/11/2018 3:50 PM  Performed by: BRYANNA POLLACK  Authorized by: BRYANNA POLLACK     Indications:  Osteoarthritis  Guidance: ultrasound    Approach:  Superolateral  Location:  Knee  Site:  R knee joint  Medications:  48 mg hylan 48 MG/6ML  Aspirate amount (mL):  4  Aspirate:  Serous and yellow  Outcome:  Tolerated well, no immediate complications  Consent Given by:  Patient  Prep: patient was prepped and draped in usual sterile fashion          Radiology  RIGHT KNEE STANDING TWO VIEWS 10/6/2015 1:47 PM   HISTORY: Pain in right knee.  COMPARISON: None.  IMPRESSION  IMPRESSION:  Moderate to severe joint space narrowing in the right  medial knee compartment with mild osteophyte formation overlying both  lateral and medial knee compartments. Patellofemoral articulation  intact on the right. Calcification is noted posterior to the distal  femur measuring up to 1.2 cm. This is posterior to expected location  of fabella, or joint space. This may be within the soft tissue, though  etiology unknown. No significant knee effusion. Small patellar  osteophytes. No acute fracture. Slight cortical irregularity in the  proximal to mid left fibular diaphysis. This may be artifactual,  though limited visualization.    Assessment:  1. Primary osteoarthritis of right knee    2. Chronic pain of right knee        Plan:  Discussed the assessment with the patient.  Follow up: 4-6 weeks if not improving significantly  Repeat aspiration and synvisc injection done today under ultrasound guidance  Discussed range of conservative options including appropriate use of injection therapy consider Visco Supplementation in the future if not significant help from steroid injection  MRI and further or the referral can also be considered in the future given his intermittent swelling episodes  Likely the only surgical option would be total arthroplasty, we'll defer that discussion to the surgeon, however he is quite young to consider this and so we'll try to continue to manage conservatively for now  Expectations and limitations of synvisc were reviewed in detail  Often 4-6 weeks before full effect may be noticed  Usually covered up to every 6 months by insurance, but does not need to be repeated unless pain returns, at which point we would re-evaluate  Potential use of CSI in future for flares of pain reviewed in detail  Encouraged modified progressive pain-free activity as tolerated  HEP and Supportive care reviewed  All questions were answered today  Contact us with additional questions or concerns  Signs and sx of concern  reviewed      Gilbert Dudley DO, CAQ  Primary Care Sports Medicine  [facility]           Disclaimer: This note consists of symbols derived from keyboarding, dictation and/or voice recognition software. As a result, there may be errors in the script that have gone undetected. Please consider this when interpreting information found in this chart.

## 2019-02-26 ENCOUNTER — TRANSFERRED RECORDS (OUTPATIENT)
Dept: HEALTH INFORMATION MANAGEMENT | Facility: CLINIC | Age: 54
End: 2019-02-26

## 2019-03-08 ENCOUNTER — TELEPHONE (OUTPATIENT)
Dept: FAMILY MEDICINE | Facility: CLINIC | Age: 54
End: 2019-03-08

## 2019-03-08 DIAGNOSIS — M25.511 ACUTE PAIN OF RIGHT SHOULDER: Primary | ICD-10-CM

## 2019-03-08 NOTE — TELEPHONE ENCOUNTER
Reason for Call: Request for an order or referral:    Order or referral being requested: For Rehab or seen by Ortho for a dislocation shoulder. He would like to be seen in  cause his insurance covers it. Kylie did a referral for there clinics and he asked if they could forward it to  they would not.    Date needed: at your convenience    Has the patient been seen by the PCP for this problem? NO Was seen by [facility] ED     Phone number Patient can be reached at:  Home number on file [phone] (home)    Best Time:  any    Can we leave a detailed message on this number?  YES    Call taken on 3/8/2019 at 3:25 PM by Nichelle Franklin

## 2019-03-08 NOTE — TELEPHONE ENCOUNTER
"S-(situation): Pt requesting order for PT.    B-(background): Pt seen at [facility] 2/26/19    A-(assessment):   Pt requesting referral for Physical Therapy.  Seen by Naldo Conde at [facility] for a R dislocated shoulder on 2/26/19.  Has not started any PT as of yet.  Pt reports he was told by [facility] they have forwarded the \"medical stuff to you.\"     R-(recommendations): Explained to pt that he needs to be seen as his last OV was 3/27/18, and discuss ordering a PT Referral. He does not feel this is really necessary and wondering if we can just talk to the doctor he saw or just review the records. He is trying to aviod another medical bill. Notified pt PCP would be consulted and contact would be made with pt on Monday when PCP back in clinic.      Elle VIDES RN        "

## 2019-03-11 NOTE — TELEPHONE ENCOUNTER
Patient notified of PT referral, gave patient phone number to schedule an appointment.  Patient verbalized understanding.    Kassie MARTINEZ Rn

## 2019-03-11 NOTE — TELEPHONE ENCOUNTER
I was able to review the records and I placed a physical therapy referral - patient can schedule appointment

## 2019-03-12 ENCOUNTER — HOSPITAL ENCOUNTER (OUTPATIENT)
Dept: PHYSICAL THERAPY | Facility: CLINIC | Age: 54
Setting detail: THERAPIES SERIES
End: 2019-03-12
Attending: NURSE PRACTITIONER
Payer: COMMERCIAL

## 2019-03-12 DIAGNOSIS — M25.511 ACUTE PAIN OF RIGHT SHOULDER: ICD-10-CM

## 2019-03-12 PROCEDURE — 97110 THERAPEUTIC EXERCISES: CPT | Mod: GP | Performed by: PHYSICAL THERAPIST

## 2019-03-12 PROCEDURE — 97161 PT EVAL LOW COMPLEX 20 MIN: CPT | Mod: GP | Performed by: PHYSICAL THERAPIST

## 2019-03-12 NOTE — PROGRESS NOTES
03/12/19 1400   General Information   Type of Visit Initial OP Ortho PT Evaluation   Start of Care Date 03/12/19   Referring Physician MINO Mirza CNP   Patient/Family Goals Statement To strengthen the shoulder, avoid shoulder dislocation again   Orders Evaluate and Treat   Date of Order 03/10/19   Insurance Type [employer]   Medical Diagnosis Acute pain of right shoulder   Body Part(s)   Body Part(s) Shoulder   Presentation and Etiology   Pertinent history of current problem (include personal factors and/or comorbidities that impact the POC) Per chart review: patient experienced a right shoulder dislocation 2/26/19 after falling down stairs, landing on his right arm.  Arm was reduced in the field.  Patient is not sure which direction it dislocated.   Impairments A. Pain;D. Decreased ROM;F. Decreased strength and endurance   Functional Limitations perform activities of daily living;perform required work activities;perform desired leisure / sports activities   How/Where did it occur At home;With a fall   Onset date of current episode/exacerbation 02/26/19   Chronicity New   Pain rating (0-10 point scale) Best (/10);Worst (/10)   Best (/10) 2   Worst (/10) 5   Pain quality C. Aching   Frequency of pain/symptoms C. With activity   Pain/symptoms are: The same all the time   Pain/symptoms exacerbated by C. Lifting;D. Carrying;G. Certain positions;J. ADL;L. Work tasks   Pain/symptoms eased by C. Rest;E. Changing positions;H. Cold   Progression of symptoms since onset: Improved   Prior Level of Function   Prior Level of Function-Mobility Independent   Prior Level of Function-ADLs Independent   Current Level of Function   Current Community Support Family/friend caregiver   Patient role/employment history A. Employed   Employment Comments Right handed; sells vinyl records   Living environment [place]   Fall Risk Screen   Fall screen completed by PT   Have you fallen 2 or more times in the past year? No    Have you fallen and had an injury in the past year? No   Is patient a fall risk? No   Functional Scales   Functional Scales Other   Other Scales  See SPADI   Shoulder Objective Findings   Side (if bilateral, select both right and left) Right   Posture Right rounded shoulders; increased thoracic kyphosis   Right Shoulder Flexion AROM right=165 deg with trapezius pain; left=170 deg   Right Shoulder ER AROM 60 deg lanie pain free   Right Shoulder Flexion Strength 4/5   Right Shoulder ER Strength 4/5   Right Shoulder IR Strength 5/5   Right Lower Trapezius Strength 4/5   Planned Therapy Interventions   Planned Therapy Interventions ADL retraining;balance training;joint mobilization;manual therapy;motor coordination training;neuromuscular re-education;ROM;stretching;strengthening   Planned Modality Interventions   Planned Modality Interventions Cryotherapy   Clinical Impression   Criteria for Skilled Therapeutic Interventions Met yes, treatment indicated   PT Diagnosis s/p right shoulder dislocation   Influenced by the following impairments Pain; weakness; impaired ROM; impaired posture   Functional limitations due to impairments Pain and difficulty with UE ADL's, driving, and sleeping   Clinical Presentation Stable/Uncomplicated   Clinical Presentation Rationale (+) good ROM, low pain, overall health   Clinical Decision Making (Complexity) Low complexity   Therapy Frequency other (see comments)   Predicted Duration of Therapy Intervention (days/wks) 1x every 3 weeks for 6 weeks   Risk & Benefits of therapy have been explained Yes   Patient, Family & other staff in agreement with plan of care Yes   Clinical Impression Comments Nik arrives today 2 weeks s/p shoulder dislocation; he will benefit from skilled PT to address the above impairments and disabilities.   Education Assessment   Preferred Learning Style Listening;Demonstration;Pictures/video   Barriers to Learning No barriers   ORTHO GOALS   PT Ortho Eval Goals  1;2;3   Ortho Goal 1   Goal Description Patient will have 5/5 right GH ER strength to reduce future risk of dislocation.   Target Date 05/07/19   Ortho Goal 2   Goal Description Patient will be able to sleep without waking due to right shoulder pain.   Target Date 04/23/19   Ortho Goal 3   Goal Description Patient will be independent with his HEP to reduce future occurrence of pain and disability.   Target Date 04/02/19   Total Evaluation Time   PT Eval, Low Complexity Minutes (17251) 20       Greta Hair, PT, DPT  Doctor of Physical Therapy #[id number]  [facility]  [phone]  [email or web address]

## 2019-04-01 ENCOUNTER — HOSPITAL ENCOUNTER (OUTPATIENT)
Dept: PHYSICAL THERAPY | Facility: CLINIC | Age: 54
Setting detail: THERAPIES SERIES
End: 2019-04-01
Attending: NURSE PRACTITIONER
Payer: COMMERCIAL

## 2019-04-01 PROCEDURE — 97110 THERAPEUTIC EXERCISES: CPT | Mod: GP | Performed by: PHYSICAL THERAPIST

## 2019-04-22 ENCOUNTER — HOSPITAL ENCOUNTER (OUTPATIENT)
Dept: PHYSICAL THERAPY | Facility: CLINIC | Age: 54
Setting detail: THERAPIES SERIES
End: 2019-04-22
Attending: NURSE PRACTITIONER
Payer: COMMERCIAL

## 2019-04-22 PROCEDURE — 97110 THERAPEUTIC EXERCISES: CPT | Mod: GP | Performed by: PHYSICAL THERAPIST

## 2019-05-13 ENCOUNTER — OFFICE VISIT (OUTPATIENT)
Dept: FAMILY MEDICINE | Facility: CLINIC | Age: 54
End: 2019-05-13
Payer: COMMERCIAL

## 2019-05-13 VITALS
HEART RATE: 72 BPM | SYSTOLIC BLOOD PRESSURE: 106 MMHG | BODY MASS INDEX: 25.43 KG/M2 | TEMPERATURE: 98 F | RESPIRATION RATE: 16 BRPM | HEIGHT: 67 IN | WEIGHT: 162 LBS | DIASTOLIC BLOOD PRESSURE: 68 MMHG | OXYGEN SATURATION: 97 %

## 2019-05-13 DIAGNOSIS — Z12.12 SCREENING FOR MALIGNANT NEOPLASM OF THE RECTUM: Primary | ICD-10-CM

## 2019-05-13 DIAGNOSIS — S43.004S CLOSED DISLOCATION OF RIGHT SHOULDER, SEQUELA: ICD-10-CM

## 2019-05-13 PROCEDURE — 99213 OFFICE O/P EST LOW 20 MIN: CPT | Performed by: FAMILY MEDICINE

## 2019-05-13 ASSESSMENT — MIFFLIN-ST. JEOR: SCORE: 1542.42

## 2019-05-13 NOTE — PROGRESS NOTES
"  SUBJECTIVE:   Luis Eduardo Stone is a 54 year old male who presents to clinic today for the following   health issues:        ED/UC Followup:    Facility:  [facility] for the ED visit at [facility].   Date of visit: 2-26-19  Reason for visit: Shoulder pain-right side.  Dislocated his shoulder.    Current Status: He called the clinic on 3-8-19 to ask about having PT orders placed.  Records were transferred here, reviewed and PT was ordered.  He had his evaluation on 3-12-19 with sessions following.  He is still having pain and the Therapist is concerned about that.  He is not taking Tylenol or Ibuprofen for the shoulder pain.     Current Outpatient Medications:      ALBUTEROL INHALER 17GM, This product no longer available, Disp: , Rfl:      SYNTHROID 125 MCG tablet, Take 1 tablet (125 mcg) by mouth daily, Disp: 90 tablet, Rfl: 3     Thyroid (LEVOTHYROXINE-LIOTHYRONINE) 30 MG TABS, Take 30 mg by mouth daily, Disp: 90 tablet, Rfl: 3    Current Facility-Administered Medications:      hylan (SYNVISC ONE) injection 48 mg, 48 mg, , , Gilbert Dudley, , 48 mg at 10/11/18 1550    Patient Active Problem List   Diagnosis     CARDIOVASCULAR SCREENING; LDL GOAL LESS THAN 160     Hypothyroidism     Mild asthma exacerbation     Family history of pseudogout       Blood pressure 106/68, pulse 72, temperature 98  F (36.7  C), temperature source Tympanic, resp. rate 16, height 1.708 m (5' 7.25\"), weight 73.5 kg (162 lb), SpO2 97 %.    Exam:  GENERAL APPEARANCE: healthy, alert and no distress  MS: decreased range of motion of the right shoulder in abduction and external rotation and tender to palpation laterally  SKIN: no suspicious lesions or rashes  NEURO: Normal strength and tone, sensory exam grossly normal, mentation intact and speech normal  PSYCH: mentation appears normal and affect normal/bright      (S43.004S) Closed dislocation of right shoulder, sequela  Comment:   Plan: ORTHO  REFERRAL        We " discussed the injury and avoid the use of the right arm out to the side and externally rotating it behind the  Head.   Limit lifting to 20 lbs with the right arm. Continue the therapy and make the Ortho appt with our shoulder specialist, Dr. Crawford for about 2-3 weeks from now.   Use ice for 5-10 minutes as needed. Avoid heat. Advil and Tylenol may be used as needed.     (Z12.12) Screening for malignant neoplasm of the rectum   Comment:   Plan: Fecal colorectal cancer screen (FIT)        Do this and mail it in. We will call the results. Monitor the stools for changes.   If the colonoscopy is desired then call our clinic RN at [phone].     Jimy Perez

## 2019-05-13 NOTE — LETTER
My Asthma Action Plan  Name: Luis Eduardo Stone   YOB: 1965  Date: 5/13/2019   My doctor: Jimy Perez MD   [place] [facility]        My Control Medicine:   My Rescue Medicine: Albuterol (Proair/Ventolin/Proventil) inhaler As needed.   My Asthma Severity: intermittent  Avoid your asthma triggers: A list of triggers is enclosed with your letter.               GREEN ZONE   Good Control    I feel good    No cough or wheeze    Can work, sleep and play without asthma symptoms       Take your asthma control medicine every day.     1. If exercise triggers your asthma, take your rescue medication    15 minutes before exercise or sports, and    During exercise if you have asthma symptoms  2. Spacer to use with inhaler: If you have a spacer, make sure to use it with your inhaler             YELLOW ZONE Getting Worse  I have ANY of these:    I do not feel good    Cough or wheeze    Chest feels tight    Wake up at night   1. Keep taking your Green Zone medications  2. Start taking your rescue medicine:    every 20 minutes for up to 1 hour. Then every 4 hours for 24-48 hours.  3. If you stay in the Yellow Zone for more than 12-24 hours, contact your doctor.  4. If you do not return to the Green Zone in 12-24 hours or you get worse, start taking your oral steroid medicine if prescribed by your provider.           RED ZONE Medical Alert - Get Help  I have ANY of these:    I feel awful    Medicine is not helping    Breathing getting harder    Trouble walking or talking    Nose opens wide to breathe       1. Take your rescue medicine NOW  2. If your provider has prescribed an oral steroid medicine, start taking it NOW  3. Call your doctor NOW  4. If you are still in the Red Zone after 20 minutes and you have not reached your doctor:    Take your rescue medicine again and    Call 911 or go to the emergency room right away    See your regular doctor within 2 weeks of an  Emergency Room or Urgent Care visit for follow-up treatment.          Annual Reminders:  Meet with Asthma Educator,  Flu Shot in the Fall, consider Pneumonia Vaccination for patients with asthma (aged 19 and older).    Pharmacy: [facility] [id number] [address] AT [facility]                      Asthma Triggers  How To Control Things That Make Your Asthma Worse    Triggers are things that make your asthma worse.  Look at the list below to help you find your triggers and what you can do about them.  You can help prevent asthma flare-ups by staying away from your triggers.      Trigger                                                          What you can do   Cigarette Smoke  Tobacco smoke can make asthma worse. Do not allow smoking in your home, car or around you.  Be sure no one smokes at a child s day care or school.  If you smoke, ask your health care provider for ways to help you quit.  Ask family members to quit too.  Ask your health care provider for a referral to Quit Plan to help you quit smoking, or call 9-933-032-PLAN.     Colds, Flu, Bronchitis  These are common triggers of asthma. Wash your hands often.  Don t touch your eyes, nose or mouth.  Get a flu shot every year.     Dust Mites  These are tiny bugs that live in cloth or carpet. They are too small to see. Wash sheets and blankets in hot water every week.   Encase pillows and mattress in dust mite proof covers.  Avoid having carpet if you can. If you have carpet, vacuum weekly.   Use a dust mask and HEPA vacuum.   Pollen and Outdoor Mold  Some people are allergic to trees, grass, or weed pollen, or molds. Try to keep your windows closed.  Limit time out doors when pollen count is high.   Ask you health care provider about taking medicine during allergy season.     Animal Dander  Some people are allergic to skin flakes, urine or saliva from pets with fur or feathers. Keep pets with fur or feathers out of your home.    If you  can t keep the pet outdoors, then keep the pet out of your bedroom.  Keep the bedroom door closed.  Keep pets off cloth furniture and away from stuffed toys.     Mice, Rats, and Cockroaches  Some people are allergic to the waste from these pests.   Cover food and garbage.  Clean up spills and food crumbs.  Store grease in the refrigerator.   Keep food out of the bedroom.   Indoor Mold  This can be a trigger if your home has high moisture. Fix leaking faucets, pipes, or other sources of water.   Clean moldy surfaces.  Dehumidify basement if it is damp and smelly.   Smoke, Strong Odors, and Sprays  These can reduce air quality. Stay away from strong odors and sprays, such as perfume, powder, hair spray, paints, smoke incense, paint, cleaning products, candles and new carpet.   Exercise or Sports  Some people with asthma have this trigger. Be active!  Ask your doctor about taking medicine before sports or exercise to prevent symptoms.    Warm up for 5-10 minutes before and after sports or exercise.     Other Triggers of Asthma  Cold air:  Cover your nose and mouth with a scarf.  Sometimes laughing or crying can be a trigger.  Some medicines and food can trigger asthma.

## 2019-05-13 NOTE — PATIENT INSTRUCTIONS
Thank you for choosing [facility].  You may be receiving an email and/or telephone survey request from [facility] [employer] regarding your visit today.  Please take a few minutes to respond to the survey to let us know how we are doing.      If you have questions or concerns, please contact us via [employer] or you can contact your care team at [phone].    Our Clinic hours are:  Monday 6:40 am  to 7:00 pm  Tuesday -Friday 6:40 am to 5:00 pm    The [place] outpatient lab hours are:  Monday - Friday 6:10 am to 4:45 pm  Saturdays 7:00 am to 11:00 am  Appointments are required, call [phone]    If you have clinical questions after hours or would like to schedule an appointment,  call the clinic at [phone].    (S43.004S) Closed dislocation of right shoulder, sequela  Comment:   Plan: ORTHO  REFERRAL        We discussed the injury and avoid the use of the right arm out to the side and externally rotating it behind the  Head.   Limit lifting to 20 lbs with the right arm. Continue the therapy and make the Ortho appt with our shoulder specialist, Dr. Crawford for about 2-3 weeks from now.   Use ice for 5-10 minutes as needed. Avoid heat. Advil and Tylenol may be used as needed.     (Z12.12) Screening for malignant neoplasm of the rectum   Comment:   Plan: Fecal colorectal cancer screen (FIT)        Do this and mail it in. We will call the results. Monitor the stools for changes.   If the colonoscopy is desired then call our clinic RN at [phone].

## 2019-05-14 ASSESSMENT — ASTHMA QUESTIONNAIRES: ACT_TOTALSCORE: 21

## 2019-05-20 ENCOUNTER — HOSPITAL ENCOUNTER (OUTPATIENT)
Dept: PHYSICAL THERAPY | Facility: CLINIC | Age: 54
Setting detail: THERAPIES SERIES
End: 2019-05-20
Attending: NURSE PRACTITIONER
Payer: COMMERCIAL

## 2019-05-20 PROCEDURE — 97110 THERAPEUTIC EXERCISES: CPT | Mod: GP | Performed by: PHYSICAL THERAPIST

## 2019-05-20 NOTE — PROGRESS NOTES
Outpatient Physical Therapy Discharge Note     Patient: Luis Eduardo Stone  : 1965    Beginning/End Dates of Reporting Period:  3/12/19 to 2019    Referring Provider: MINO Mirza CNP    Therapy Diagnosis: S/p right shoulder dislocation     Client Self Report: Patient reports: he is continuing to move out of his old apartment, this has caused shoulder pain.  Does not feel the pain will resolve until he is done moving at the end of the month.  However, he does feel stronger.  Did not want to follow up with orthopedics as he thought the  was too rude.    Objective Measurements:  Objective Measure: Right GH MMT  Details: ER=4/5; IR=5/5  Objective Measure: GH AROM aqxrefg=883 deg end range pain  Details: ER=60 deg (lanie) pain free         Goals:  Goal Identifier     Goal Description Patient will have 5/5 right GH ER strength to reduce future risk of dislocation.   Target Date 19   Date Met      Progress:     Goal Identifier     Goal Description Patient will be able to sleep without waking due to right shoulder pain.   Target Date 19   Date Met      Progress:     Goal Identifier     Goal Description Patient will be independent with his HEP to reduce future occurrence of pain and disability.   Target Date 19   Date Met  19   Progress:       Progress Toward Goals:   Progress this reporting period: Nik attended 4 PT sessions to address his right shoulder pain s/p dislocation.  He made fair progress with rotator cuff strengthening.  Minimal progress with pain relief, likely aggravated by repetitive lifting as patient is moving.  Patient was educated in a long term HEP to address rotator cuff and scapular strength to reduce risk of future dislocation.  Otherwise, recommended follow up with orthopedics per MD order.      Plan:  Discharge from therapy.    Discharge:    Reason for Discharge: No further expectation of progress.    Equipment Issued: Theraband    Discharge  Plan: Patient to continue home program.      Greta Hair, PT, DPT  Doctor of Physical Therapy #[id number]  [facility]  [phone]  [email or web address]

## 2019-07-23 ENCOUNTER — HOSPITAL ENCOUNTER (OUTPATIENT)
Dept: MRI IMAGING | Facility: CLINIC | Age: 54
Discharge: HOME | End: 2019-07-23
Attending: ORTHOPAEDIC SURGERY | Admitting: ORTHOPAEDIC SURGERY
Payer: COMMERCIAL

## 2019-07-23 DIAGNOSIS — M25.519 SHOULDER PAIN: ICD-10-CM

## 2019-07-23 PROCEDURE — 73221 MRI JOINT UPR EXTREM W/O DYE: CPT | Mod: RT

## 2019-09-24 DIAGNOSIS — E06.3 HYPOTHYROIDISM DUE TO HASHIMOTO'S THYROIDITIS: ICD-10-CM

## 2019-09-24 DIAGNOSIS — E03.9 HYPOTHYROIDISM, UNSPECIFIED TYPE: ICD-10-CM

## 2019-09-24 RX ORDER — LEVOTHYROXINE SODIUM 125 MCG
125 TABLET ORAL DAILY
Qty: 60 TABLET | Refills: 0 | Status: SHIPPED | OUTPATIENT
Start: 2019-09-24 | End: 2019-11-21

## 2019-09-24 RX ORDER — THYROID 30 MG/1
30 TABLET ORAL DAILY
Qty: 60 TABLET | Refills: 0 | Status: SHIPPED | OUTPATIENT
Start: 2019-09-24 | End: 2019-11-21

## 2019-09-24 NOTE — TELEPHONE ENCOUNTER
SYNTHROID 125 MCG tablet      Last Written Prescription Date:  9-21-18  Last Fill Quantity: 90,   # refills: 3  Last Office Visit : 9-21-18  Future Office visit:  none    Routing refill request to provider for review/approval because:  Overdue lab: TSH    thyroid (NP THYROID) 30 MG tablet      Last Written Prescription Date:  9-21-18  Last Fill Quantity: 90,   # refills: 3    Routing refill request to provider for review/approval because:  Overdue lab; TSH  Med not on protocol    Scheduling has been notified to contact the pt for appointment.

## 2019-10-05 ENCOUNTER — HEALTH MAINTENANCE LETTER (OUTPATIENT)
Age: 54
End: 2019-10-05

## 2019-10-10 ENCOUNTER — TELEPHONE (OUTPATIENT)
Dept: ENDOCRINOLOGY | Facility: CLINIC | Age: 54
End: 2019-10-10

## 2019-10-10 NOTE — TELEPHONE ENCOUNTER
LVM for pt to schedule in light of recent medication refill request, following up on unread MyC msg.

## 2020-01-20 DIAGNOSIS — E03.9 HYPOTHYROIDISM, UNSPECIFIED TYPE: ICD-10-CM

## 2020-01-20 NOTE — TELEPHONE ENCOUNTER
NP THYROID 30 MG tablet        Last Written Prescription Date:  11/22/2019  Last Fill Quantity: 60,   # refills: 0  Last Office Visit : 09/21/2018  Future Office visit:  none    Routing refill request to provider for review/approval because: per protocol.    Scheduling has been notified to contact the pt for appointment.

## 2020-01-21 RX ORDER — THYROID 30 MG/1
30 TABLET ORAL DAILY
Qty: 60 TABLET | Refills: 0 | Status: SHIPPED | OUTPATIENT
Start: 2020-01-21 | End: 2020-04-10

## 2020-01-22 DIAGNOSIS — E06.3 HYPOTHYROIDISM DUE TO HASHIMOTO'S THYROIDITIS: ICD-10-CM

## 2020-01-22 DIAGNOSIS — E03.9 HYPOTHYROIDISM, UNSPECIFIED TYPE: ICD-10-CM

## 2020-01-23 DIAGNOSIS — E06.3 HYPOTHYROIDISM DUE TO HASHIMOTO'S THYROIDITIS: Primary | ICD-10-CM

## 2020-01-23 RX ORDER — LEVOTHYROXINE SODIUM 125 MCG
125 TABLET ORAL DAILY
Qty: 30 TABLET | Refills: 0 | Status: SHIPPED | OUTPATIENT
Start: 2020-01-23 | End: 2020-03-04

## 2020-01-23 NOTE — TELEPHONE ENCOUNTER
My chart message sent to  Nik to have the lab TSH done  . A 30 day refill of Synthroid was sent in and once the lab is done  a larger quantity can be sent. Zee Negron RN on 1/23/2020 at 3:41 PM

## 2020-01-23 NOTE — TELEPHONE ENCOUNTER
SYNTHROID 125 MCG tablet      Last Written Prescription Date:  11/22/19  Last Fill Quantity: 60,   # refills: 0  Last Office Visit : 9/21/18  Future Office visit:  3/27/20    Routing because:  Overdue appointment & lab - TSH    90 day pended.  Upcoming appointment on 3/27/20.

## 2020-03-04 DIAGNOSIS — E06.3 HYPOTHYROIDISM DUE TO HASHIMOTO'S THYROIDITIS: ICD-10-CM

## 2020-03-04 DIAGNOSIS — E03.9 HYPOTHYROIDISM, UNSPECIFIED TYPE: ICD-10-CM

## 2020-03-04 RX ORDER — LEVOTHYROXINE SODIUM 125 MCG
125 TABLET ORAL DAILY
Qty: 30 TABLET | Refills: 0 | Status: SHIPPED | OUTPATIENT
Start: 2020-03-04 | End: 2020-03-25

## 2020-03-04 NOTE — TELEPHONE ENCOUNTER
SYNTHROID 125 MCG tablet      Last Written Prescription Date:  1-23-20  Last Fill Quantity: 30,   # refills: 0  Last Office Visit : 9-21-18  Future Office visit:  3-27-20    Routing refill request to provider for review/approval because:  Overdue lab: TSH  Previous 30 day rebecca RF given  Previous 60 day rebecca Rf given  X 2    Has not completed TSH lab- ordered 1-23-20: FYI to Clinic Triage RN

## 2020-03-22 DIAGNOSIS — E06.3 HYPOTHYROIDISM DUE TO HASHIMOTO'S THYROIDITIS: Primary | ICD-10-CM

## 2020-03-24 NOTE — TELEPHONE ENCOUNTER
Spoke with pt to change Friday 3/7 appointment to telephone visit. Pt cancelled appointment instead, and declined to schedule labs as well, and stated that they would call back to schedule another time. Pt requested a 2 month refill on medication. Pt was informed that since they have not been seen since 2018, a 2 month refill without being seen was unlikely. Pt still declined to reschedule or schedule labs. Sending to nursing team for guidance.

## 2020-03-25 DIAGNOSIS — E03.9 HYPOTHYROIDISM, UNSPECIFIED TYPE: ICD-10-CM

## 2020-03-25 DIAGNOSIS — E06.3 HYPOTHYROIDISM DUE TO HASHIMOTO'S THYROIDITIS: ICD-10-CM

## 2020-03-25 RX ORDER — LEVOTHYROXINE SODIUM 125 MCG
125 TABLET ORAL DAILY
Qty: 30 TABLET | Refills: 0 | Status: SHIPPED | OUTPATIENT
Start: 2020-03-25 | End: 2020-03-26

## 2020-03-25 NOTE — TELEPHONE ENCOUNTER
Patient last seen 2018 declining telephone visit or lab draws but still want the refill for 2 months on synthroid 125 mcg. He was given a 30 day supply  3/4/20  Until seen but then cancelled. Zee Negron RN on 3/25/2020 at 8:42 AM  .

## 2020-03-25 NOTE — TELEPHONE ENCOUNTER
Routed 30 day fill to [facility] no refills. He needs to schedule Zee Negron RN on 3/25/2020 at 3:06 PM

## 2020-03-26 ENCOUNTER — TELEPHONE (OUTPATIENT)
Dept: ENDOCRINOLOGY | Facility: CLINIC | Age: 55
End: 2020-03-26

## 2020-03-26 DIAGNOSIS — E06.3 HYPOTHYROIDISM DUE TO HASHIMOTO'S THYROIDITIS: ICD-10-CM

## 2020-03-26 DIAGNOSIS — E03.9 HYPOTHYROIDISM, UNSPECIFIED TYPE: ICD-10-CM

## 2020-03-26 RX ORDER — LEVOTHYROXINE SODIUM 125 MCG
125 TABLET ORAL DAILY
Qty: 90 TABLET | Refills: 0 | Status: SHIPPED | OUTPATIENT
Start: 2020-03-26 | End: 2020-08-17

## 2020-03-26 NOTE — TELEPHONE ENCOUNTER
Refill  For Synthroid given a 90 day supply no refills per Dr Herman. Patieint was last seen 2018 declining to come into clinic now or a telephone call. Zee Negron RN on 3/26/2020 at 9:16 AM  .

## 2020-03-26 NOTE — TELEPHONE ENCOUNTER
----- Message from Dany Erazo MD sent at 3/26/2020  8:59 AM CDT -----  Deejay Koch,    Please give him 3 months supply without refill for now. I have replied his message.    Thanks,Dany

## 2020-04-03 DIAGNOSIS — E03.9 HYPOTHYROIDISM, UNSPECIFIED TYPE: ICD-10-CM

## 2020-04-07 RX ORDER — LEVOTHYROXINE, LIOTHYRONINE 19; 4.5 UG/1; UG/1
TABLET ORAL
Qty: 60 TABLET | Refills: 0 | OUTPATIENT
Start: 2020-04-07

## 2020-04-10 ENCOUNTER — TELEPHONE (OUTPATIENT)
Dept: ENDOCRINOLOGY | Facility: CLINIC | Age: 55
End: 2020-04-10

## 2020-04-10 DIAGNOSIS — E03.9 HYPOTHYROIDISM, UNSPECIFIED TYPE: ICD-10-CM

## 2020-04-10 RX ORDER — THYROID 30 MG/1
30 TABLET ORAL DAILY
Qty: 60 TABLET | Refills: 0 | Status: SHIPPED | OUTPATIENT
Start: 2020-04-10 | End: 2020-06-12

## 2020-04-10 NOTE — TELEPHONE ENCOUNTER
PLEASE HELP SCHEDULE THE FOLLOWING APPT(S): Return Appointment    TIME FRAME NEEDED BY: First available.       SCHEDULING COMMENTS (optional): with Dr Saenz

## 2020-05-06 DIAGNOSIS — E06.3 HYPOTHYROIDISM DUE TO HASHIMOTO'S THYROIDITIS: ICD-10-CM

## 2020-05-06 DIAGNOSIS — E03.9 HYPOTHYROIDISM, UNSPECIFIED TYPE: ICD-10-CM

## 2020-05-08 RX ORDER — LEVOTHYROXINE SODIUM 125 MCG
TABLET ORAL
Qty: 30 TABLET | OUTPATIENT
Start: 2020-05-08

## 2020-06-10 DIAGNOSIS — E03.9 HYPOTHYROIDISM, UNSPECIFIED TYPE: ICD-10-CM

## 2020-06-12 RX ORDER — LEVOTHYROXINE, LIOTHYRONINE 19; 4.5 UG/1; UG/1
TABLET ORAL
Qty: 30 TABLET | Refills: 0 | Status: SHIPPED | OUTPATIENT
Start: 2020-06-12 | End: 2020-07-16

## 2020-06-12 NOTE — TELEPHONE ENCOUNTER
"6/12/20; Dr Erazo  'Patient should have at least virtual and lab visit. \"   Scheduling has been notified to contact the pt for appointment.    THYROID NP 0.5GR (30MG) TABLETS   Last Written Prescription Date:  3/26/20  Last Fill Quantity: 90,   # refills: 0  Last Office Visit : 9/12/2018  Future Office visit:  None. Cancelled x 2 March 2020.  Routing refill request to provider for review/approval because:  Labs and appt overdue.<18 months since seen with labs last done 9/12/2018. 3/26/20 90 day rebecca per Dr Erazo  3/26/20 [employer] converstation>Concerns from patient re: safety during COVID  and poverty.    4/13/20: [employer] message not read.    Scheduling has been notified to contact the pt for appointment.        "

## 2020-07-12 DIAGNOSIS — E03.9 HYPOTHYROIDISM, UNSPECIFIED TYPE: ICD-10-CM

## 2020-07-15 RX ORDER — THYROID 30 MG/1
TABLET ORAL
Qty: 30 TABLET | Refills: 0 | OUTPATIENT
Start: 2020-07-15

## 2020-07-15 NOTE — TELEPHONE ENCOUNTER
Per provider :Please advise patient to get blood work for thyroid test. Last one was 2 years. It will be difficult for me to keep refilling without blood test monitoring. Please advise him.

## 2020-07-15 NOTE — TELEPHONE ENCOUNTER
THYROID (ARMOUR) 0.5GR (30MG) TABS   Last Written Prescription Date:  6/12/20  Last Fill Quantity: 30,   # refills: 0  Last Office Visit : 9/12/2018  Future Office visit:  None    Routing refill request to provider for review/approval because:  Not on refill protocol.  Last refill request> per Dr Erazo>'Patient should have at least virtual and lab visit.  No labs or appt. have been scheduled.    Scheduling has been notified to contact the pt for appointment.

## 2020-07-16 ENCOUNTER — TELEPHONE (OUTPATIENT)
Dept: ENDOCRINOLOGY | Facility: CLINIC | Age: 55
End: 2020-07-16

## 2020-07-16 DIAGNOSIS — E03.9 HYPOTHYROIDISM, UNSPECIFIED TYPE: ICD-10-CM

## 2020-07-16 RX ORDER — THYROID 30 MG/1
30 TABLET ORAL DAILY
Qty: 30 TABLET | Refills: 0 | Status: SHIPPED | OUTPATIENT
Start: 2020-07-16 | End: 2020-08-13

## 2020-07-16 NOTE — TELEPHONE ENCOUNTER
I spoke with Nik who has been out of the [place] for a couple days so labs would not be valuable. I sent in a 30 day supply that he can't  until tomorrow. How long should he wait after taking both Synthroid and [place] daily before labs will be accurate ? He is homeless at this time but still working. He prefers to do the labs versus face to face visit in . Zee Negron RN on 7/16/2020 at 12:11 PM

## 2020-07-16 NOTE — TELEPHONE ENCOUNTER
Pt notified via MyChart and voicemail message.   Marly Mccord, RN on 7/16/2020 at 9:44 AM           PLEASE HELP SCHEDULE THE FOLLOWING APPT(S): Return Appointment    TIME FRAME NEEDED BY: Other (specify in comments.)       SCHEDULING COMMENTS (optional): Monday 08/03/2020 with Dr Saenz in .                  Dany Marmolejo MD  P Med Specialties Endo Triage-    Caller: Unspecified (4 days ago,  6:03 AM)               He can get only blood work. Otherwise, he can have in person visit with me at [facility] on the first Monday of August. Please advise him.       Thanks,  Dany    Previous Messages           Refused Prescriptions       Name from pharmacy: THYROID (ARMOUR) 0.5GR (30MG) TABS          Will file in chart as: ARMOUR THYROID 30 MG tablet         Zee Negron, Dany Monzon MD 18 hours ago (2:41 PM)       Declined tele visit last seen 2018 wants to be seen in person    Routing comment

## 2020-07-17 ENCOUNTER — TELEPHONE (OUTPATIENT)
Dept: ENDOCRINOLOGY | Facility: CLINIC | Age: 55
End: 2020-07-17

## 2020-07-17 NOTE — TELEPHONE ENCOUNTER
Nik was notified of refills sent and the need for labs that can be done as early next week. Zee Negron RN on 7/17/2020 at 1:31 PM

## 2020-07-17 NOTE — TELEPHONE ENCOUNTER
----- Message from Dany Erazo MD sent at 7/16/2020 11:31 PM CDT -----  Regarding: RE: telephone call  He can do it next week. I have refilled medication for only 1 month.    Thanks,Dany  ----- Message -----  From: Zee Negron RN  Sent: 7/16/2020  12:12 PM CDT  To: Dany Erazo MD  Subject: telephone call                                   I put in a telephone call but forgot to route to you . He has been out of armour a couple days  and can't  more until tomorrow . He is homeless but still working. He wants labs to be when he is taking both [place] and Synthroid consistently  for the best result. How long should he wait after missing 3-4 days to get the labs done for the best result to  view if change is needed ? Zee BRANTLEY

## 2020-08-03 DIAGNOSIS — E03.9 HYPOTHYROIDISM, UNSPECIFIED TYPE: ICD-10-CM

## 2020-08-03 DIAGNOSIS — E06.3 HYPOTHYROIDISM DUE TO HASHIMOTO'S THYROIDITIS: ICD-10-CM

## 2020-08-06 RX ORDER — LEVOTHYROXINE SODIUM 125 MCG
TABLET ORAL
Qty: 90 TABLET | Refills: 0 | OUTPATIENT
Start: 2020-08-06

## 2020-08-06 NOTE — TELEPHONE ENCOUNTER
Dictation note from 9/21/18:  PLAN:   - check TSH, FT4, TT3   - will adjust dose and refill accordingly.  - He can follow up with PCP in 1 year for annual thyroid test  Refill declined

## 2020-08-13 ENCOUNTER — TELEPHONE (OUTPATIENT)
Dept: ENDOCRINOLOGY | Facility: CLINIC | Age: 55
End: 2020-08-13

## 2020-08-13 DIAGNOSIS — E03.9 HYPOTHYROIDISM, UNSPECIFIED TYPE: ICD-10-CM

## 2020-08-13 RX ORDER — THYROID 30 MG/1
30 TABLET ORAL DAILY
Qty: 7 TABLET | Refills: 0 | Status: SHIPPED | OUTPATIENT
Start: 2020-08-13 | End: 2020-08-18

## 2020-08-13 NOTE — TELEPHONE ENCOUNTER
"Applying for [employer] assistance. Anxiety about lab draws. States he is homeless and unable to afford draw or virtual visit. Asked if he had applied for [place] or [place]. States he has not, would just like [employer] assistance for coverage. Will  Rx and have labs drawn in 8 days. Will call to get quote for virtual visit with Dr Saenz. States he \"feels good and isn't that what matters\"  Sent to Tracey aHrrison for review.   Sent to Lola Hart for update.   Marly Mccord RN on 8/13/2020 at 12:16 PM     "

## 2020-08-13 NOTE — TELEPHONE ENCOUNTER
Left detailed message on PTs vm to complete lab draw currently scheduled 08/14/2020 and to schedule follow up and keep appt with Dr Saenz.   Marly Mccord RN on 8/13/2020 at 12:10 PM

## 2020-08-13 NOTE — TELEPHONE ENCOUNTER
----- Message from Zee Negron RN sent at 8/13/2020 12:02 PM CDT -----  Regarding: FW: labs non compliance  Please call my chart message not read. 7 days of refill labs next week or he goes elsewhere . No discussion anymore ...per orders Dany   ----- Message -----  From: Dany Erazo MD  Sent: 8/13/2020   9:15 AM CDT  To: Zee Negron RN, Lola Hart MA  Subject: RE: labs non compliance                          Hi Zee,    Give him only 1 week with no more refill. He needs to get lab.     Lola, please weigh in. This pt has not complied with lab and visit for the past 2 years. We have been given him so many refills without having lab or being seen in the past 2 years.     Dany  ----- Message -----  From: Zee Negron RN  Sent: 8/12/2020   6:35 PM CDT  To: Dany Erazo MD  Subject: labs non compliance                              Nik was to  get labs done within 30 days of 7/17/20  but didn't. He was last seen 9/2018 no show cancel appointments. He gave verbal understanding  7/17/20 that he would get a 30 day refill but labs had to be done  for a refill.   He scheduled the lab visit for this  Friday but tells us he has been out of medication for 4 days once again. He doesn't want to get the labs done now again  having missed doses . I sympathize with him but  ....disorder you want him to get the labs done Friday knowing he is out of medications  6 days  or give temporary refill  and labs in so many days ? Or tell him to go to urgent care . He does not have  a PCP . Frustrations  in the game playing for 2 years now . Zee BRANTLEY  ----- Message -----  From: Lola Hart MA  Sent: 8/12/2020   3:54 PM CDT  To: [facility] Endo Triage-[facility]    Patient is having labs on Fri at [place]. He spoke to Valencia MCLEOD because Romina thought he was requesting a Face to Face visit.     He really just ended up needing his synthyroid refilled. Can you call him to make sure he has  the medication he needs?

## 2020-08-13 NOTE — TELEPHONE ENCOUNTER
Juan Chowdhury Mary P, [facility]; Marly Mccord, RN    Cc: Lola Hart MA; Zee Negron RN    Caller: Unspecified (Today, 12:10 PM)               Good afternoon, I just spoke Nik and provided contact information [facility]. They will be able to go over the application process with him for [employer]. He also informed me that he is not comfortable with the cost of a video/telephone visit.

## 2020-08-14 DIAGNOSIS — E03.9 HYPOTHYROIDISM, UNSPECIFIED TYPE: ICD-10-CM

## 2020-08-14 DIAGNOSIS — E06.3 HYPOTHYROIDISM DUE TO HASHIMOTO'S THYROIDITIS: ICD-10-CM

## 2020-08-17 DIAGNOSIS — E03.9 HYPOTHYROIDISM, UNSPECIFIED TYPE: ICD-10-CM

## 2020-08-17 DIAGNOSIS — E06.3 HYPOTHYROIDISM DUE TO HASHIMOTO'S THYROIDITIS: ICD-10-CM

## 2020-08-17 RX ORDER — LEVOTHYROXINE SODIUM 125 MCG
125 TABLET ORAL DAILY
Qty: 7 TABLET | Refills: 0 | Status: SHIPPED | OUTPATIENT
Start: 2020-08-17 | End: 2020-08-18

## 2020-08-17 RX ORDER — LEVOTHYROXINE SODIUM 125 MCG
125 TABLET ORAL DAILY
Qty: 7 TABLET | Refills: 0 | Status: SHIPPED | OUTPATIENT
Start: 2020-08-17 | End: 2020-08-17

## 2020-08-17 RX ORDER — LEVOTHYROXINE SODIUM 125 MCG
125 TABLET ORAL DAILY
Qty: 5 TABLET | Refills: 0 | Status: CANCELLED | OUTPATIENT
Start: 2020-08-17

## 2020-08-18 DIAGNOSIS — E06.3 HYPOTHYROIDISM DUE TO HASHIMOTO'S THYROIDITIS: ICD-10-CM

## 2020-08-18 DIAGNOSIS — E03.9 HYPOTHYROIDISM, UNSPECIFIED TYPE: ICD-10-CM

## 2020-08-18 RX ORDER — LEVOTHYROXINE SODIUM 125 MCG
TABLET ORAL
Qty: 90 TABLET | Refills: 0 | OUTPATIENT
Start: 2020-08-18

## 2020-08-18 RX ORDER — THYROID 30 MG/1
30 TABLET ORAL DAILY
Qty: 5 TABLET | Refills: 0 | Status: SHIPPED | OUTPATIENT
Start: 2020-08-18 | End: 2020-08-27

## 2020-08-18 RX ORDER — LEVOTHYROXINE SODIUM 125 MCG
125 TABLET ORAL DAILY
Qty: 5 TABLET | Refills: 0 | Status: SHIPPED | OUTPATIENT
Start: 2020-08-18 | End: 2020-08-27

## 2020-08-18 NOTE — TELEPHONE ENCOUNTER
SYNTHROID 0.125MG (125MCG) TABLETS     SYNTHROID 125 MCG tablet  7 tablet  0  8/17/2020   Yes    Sig - Route: Take 1 tablet (125 mcg) by mouth daily - Oral    Sent to pharmacy as: Synthroid 125 MCG Oral Tablet    Class: E-Prescribe    Order: [id number]    Cosign for Ordering: Required by HIM DEFAULT ASSIGNED POOL    E-Prescribing Status: Receipt confirmed by pharmacy (8/17/2020 12:42 PM CDT)    Printout Tracking     External Result Report    Pharmacy     [facility] #[id number] - [address] AT [address]

## 2020-08-18 NOTE — TELEPHONE ENCOUNTER
SYNTHROID 0.125MG (125MCG) TABLETS   SYNTHROID 125 MCG tablet  7 tablet  0  8/17/2020   Yes    Sig - Route: Take 1 tablet (125 mcg) by mouth daily - Oral    Sent to pharmacy as: Synthroid 125 MCG Oral Tablet    Class: E-Prescribe    Order: [id number]    Cosign for Ordering: Required by HIM DEFAULT ASSIGNED POOL    E-Prescribing Status: Receipt confirmed by pharmacy (8/17/2020 12:42 PM CDT)    Printout Tracking     External Result Report    Pharmacy     [facility] #[id number] - [address] AT [address]

## 2020-08-23 DIAGNOSIS — E03.9 HYPOTHYROIDISM, UNSPECIFIED TYPE: ICD-10-CM

## 2020-08-23 DIAGNOSIS — E06.3 HYPOTHYROIDISM DUE TO HASHIMOTO'S THYROIDITIS: ICD-10-CM

## 2020-08-24 DIAGNOSIS — E06.3 HYPOTHYROIDISM DUE TO HASHIMOTO'S THYROIDITIS: ICD-10-CM

## 2020-08-24 LAB
T4 FREE SERPL-MCNC: 1.07 NG/DL (ref 0.76–1.46)
TSH SERPL DL<=0.005 MIU/L-ACNC: 3.71 MU/L (ref 0.4–4)

## 2020-08-24 PROCEDURE — 36415 COLL VENOUS BLD VENIPUNCTURE: CPT | Performed by: INTERNAL MEDICINE

## 2020-08-24 PROCEDURE — 84443 ASSAY THYROID STIM HORMONE: CPT | Performed by: INTERNAL MEDICINE

## 2020-08-24 PROCEDURE — 84480 ASSAY TRIIODOTHYRONINE (T3): CPT | Performed by: INTERNAL MEDICINE

## 2020-08-24 PROCEDURE — 84439 ASSAY OF FREE THYROXINE: CPT | Performed by: INTERNAL MEDICINE

## 2020-08-25 DIAGNOSIS — E03.9 HYPOTHYROIDISM, UNSPECIFIED TYPE: ICD-10-CM

## 2020-08-25 DIAGNOSIS — E06.3 HYPOTHYROIDISM DUE TO HASHIMOTO'S THYROIDITIS: ICD-10-CM

## 2020-08-25 LAB — T3 SERPL-MCNC: 80 NG/DL (ref 60–181)

## 2020-08-26 ENCOUNTER — TELEPHONE (OUTPATIENT)
Dept: ENDOCRINOLOGY | Facility: CLINIC | Age: 55
End: 2020-08-26

## 2020-08-26 DIAGNOSIS — E03.9 HYPOTHYROIDISM, UNSPECIFIED TYPE: ICD-10-CM

## 2020-08-26 NOTE — TELEPHONE ENCOUNTER
----- Message from Dany Erazo MD sent at 8/25/2020  9:26 AM CDT -----  You can add him on around 1030 AM.    ThanksDany  ----- Message -----  From: Zee Negron RN  Sent: 8/25/2020   9:24 AM CDT  To: Dany Erazo MD    What time Friday the schedule is full ?   ----- Message -----  From: Dany Erazo MD  Sent: 8/25/2020   9:18 AM CDT  To: [facility] Endo Triage[facility]    Hi,    Will he have a phone visit this Friday? I offer him this Friday if he wants to discuss about the change in medication.    ThanksDany

## 2020-08-26 NOTE — TELEPHONE ENCOUNTER
My chart message sent and voicemail left on scheduling with Dr Saenz Friday 8/28/20 10:30 AM  A billable telephone visit. Waiting to hear back . Zee Negron RN on 8/26/2020 at 10:10 AM

## 2020-08-27 ENCOUNTER — TELEPHONE (OUTPATIENT)
Dept: ENDOCRINOLOGY | Facility: CLINIC | Age: 55
End: 2020-08-27

## 2020-08-27 DIAGNOSIS — E03.9 HYPOTHYROIDISM, UNSPECIFIED TYPE: ICD-10-CM

## 2020-08-27 DIAGNOSIS — E06.3 HYPOTHYROIDISM DUE TO HASHIMOTO'S THYROIDITIS: ICD-10-CM

## 2020-08-27 RX ORDER — LEVOTHYROXINE SODIUM 125 MCG
TABLET ORAL
Qty: 5 TABLET | Refills: 0 | OUTPATIENT
Start: 2020-08-27

## 2020-08-27 RX ORDER — LEVOTHYROXINE SODIUM 125 MCG
125 TABLET ORAL DAILY
Qty: 10 TABLET | Refills: 0 | Status: SHIPPED | OUTPATIENT
Start: 2020-08-27 | End: 2020-08-28 | Stop reason: DRUGHIGH

## 2020-08-27 RX ORDER — THYROID 30 MG/1
TABLET ORAL
Qty: 10 TABLET | Refills: 0 | Status: SHIPPED | OUTPATIENT
Start: 2020-08-27 | End: 2020-08-28 | Stop reason: DRUGHIGH

## 2020-08-27 NOTE — TELEPHONE ENCOUNTER
Action Needed --  I've placed a hold that needs scheduling. Please see below.  Department: Endocrine    Provider: Dany ELLIS  Date/Time: Friday 8/28/20 9:20 AM  Telephone  add on per Dany DBL book   RFV: Hypothyroid   I couldn't put it on hold but it is a double book per Evette Negron, RN on 8/27/2020 at 8:23 AM

## 2020-08-27 NOTE — TELEPHONE ENCOUNTER
SYNTHROID 0.125MG (125MCG) TABLETS       Last Written Prescription Date:  8/18/20  Last Fill Quantity: 5,   # refills: 0  Last Office Visit : 9/21/18  Future Office visit:  8/28/20    Routing refill request to provider for review/approval because:  Quantity of 5 tabs without refills with last refill.  Ok to provide additional?

## 2020-08-27 NOTE — TELEPHONE ENCOUNTER
thyroid (TOO THYROID) 30 MG tablet Last Written Prescription Date:  8/18/20  Last Fill Quantity: 5  # refills 0  Last Office Visit : 9/21/18   Future Office visit:  8/28/20    Routing refill request to provider for review/approval because:  Med not on the refill protocol

## 2020-08-28 ENCOUNTER — VIRTUAL VISIT (OUTPATIENT)
Dept: ENDOCRINOLOGY | Facility: CLINIC | Age: 55
End: 2020-08-28

## 2020-08-28 DIAGNOSIS — E06.3 HYPOTHYROIDISM DUE TO HASHIMOTO'S THYROIDITIS: Primary | ICD-10-CM

## 2020-08-28 RX ORDER — LEVOTHYROXINE SODIUM 75 MCG
75 TABLET ORAL DAILY
Qty: 90 TABLET | Refills: 3 | Status: SHIPPED | OUTPATIENT
Start: 2020-08-28 | End: 2021-10-07 | Stop reason: DRUGHIGH

## 2020-08-28 RX ORDER — THYROID 60 MG/1
60 TABLET ORAL DAILY
Qty: 90 TABLET | Refills: 3 | Status: SHIPPED | OUTPATIENT
Start: 2020-08-28 | End: 2021-03-05

## 2020-08-28 NOTE — LETTER
"8/28/2020       RE: Luis Eduardo Stone  [address] [address]     Dear Colleague,    Thank you for referring your patient, Luis Eduardo Stone, to the [facility] ENDOCRINOLOGY at [facility]. Please see a copy of my visit note below.    Luis Eduardo Stone is a 55 year old male who is being evaluated via a billable telephone visit.      The patient has been notified of following:     \"This telephone visit will be conducted via a call between you and your physician/provider. We have found that certain health care needs can be provided without the need for a physical exam.  This service lets us provide the care you need with a short phone conversation.  If a prescription is necessary we can send it directly to your pharmacy.  If lab work is needed we can place an order for that and you can then stop by our lab to have the test done at a later time.    Telephone visits are billed at different rates depending on your insurance coverage. During this emergency period, for some insurers they may be billed the same as an in-person visit.  Please reach out to your insurance provider with any questions.    If during the course of the call the physician/provider feels a telephone visit is not appropriate, you will not be charged for this service.\"    Patient has given verbal consent for Telephone visit?  Yes    What phone number would you like to be contacted at? [phone]    How would you like to obtain your [facility]? [facility]          Endocrinology Note         Luis Eduardo is a 55 year old male who has telephone visit today for hypothyroidism.    HPI  Luis Eduardo Stone is a 55 years old male with hx of hypothyroidism who is here for follow up hypothyroidism. Last seen 9/2018.    He was diagnosed with hypothyroidism in 2001 during routine physical exam. He was on combination of Synthroid 100 mcg and [place] thyroid 30 mg (T4 50 mcg +T3 12.5 mcg) daily from 2003-3008. " Due to shortage of [place] thyroid, he was switched to Synthroid 150 mcg alternating with 175 mcg  every other day but did not like it and wanted to use combination of Synthroid and [place] because he felt better.     Interim history:  He is currently on Synthroid 125 mcg and [place] thyroid 30 mg (T4 50 mcg +T3 5 mcg) over the past 5 years. Lab on 8/24/20 revealed TSH 3.7, FT4 1.07, TT3 80.     He reported that he feels well but would like to make some adjustment of the thyroid medication toward [facility] thyroid. He said that mood is stable. He denied chest pain, shortness of breath, palpitation, abdominal pain, diarrhea/constipation, polyuria/polydipsia, fatigue, tremor, weight change, headache, dizziness and insomnia. His vision is stable.     He said that he has been taking selenium to help with the thyroid.    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    Allergies  Allergies   Allergen Reactions     Apple GI Disturbance     Penicillins Rash     Medications  Current Outpatient Medications   Medication Sig Dispense Refill     ALBUTEROL INHALER 17GM This product no longer available       ARMOUR THYROID 30 MG tablet TAKE 1 TABLET BY MOUTH DAILY 10 tablet 0     SYNTHROID 125 MCG tablet Take 1 tablet (125 mcg) by mouth daily 10 tablet 0     Thyroid (LEVOTHYROXINE-LIOTHYRONINE) 30 MG TABS Take 30 mg by mouth daily 90 tablet 3     Family History  No thyroid disease in the family    Social History  Social History     Tobacco Use     Smoking status: Never Smoker     Smokeless tobacco: Never Used   Substance Use Topics     Alcohol use: Yes     Comment: social     Drug use: No   Drinks alcohol occasionally  Sell Vynyl record    ROS  10 points ROS were negative otherwise mentioned in HPI    Physical Exam  Limited due to telephone visit  Speak comfortably over the phone    RESULTS       ASSESSMENT:    Luis Eduardo Stone is a 55 years old male with hx of hypothyroidism  who has telephone visit for follow up hypothyroidism    1) Hypothyroidism: diagnosed in 2001. He is feeling well on this combination. However, he would like to adjust toward [place] thyroid.   - will adjust to Synthroid 75 mcg and [place] thyroid 60 mg. He is clinically euthyroid.   - All questions answered.    PLAN:   - Synthroid 75 mcg and [place] thyroid 60 mg  - repeat thyroid test in 2 months  - return in 1 year    Phone start time: 0915 AM  Phone end time: 0924 AM  Phone duration 9 minutes    Dany Erazo MD  Endocrinology  [phone]

## 2020-08-28 NOTE — PATIENT INSTRUCTIONS
- lab in 2 months    If you have any questions, please do not hesitate to call clinic line at [phone] and ask for Endocrinology clinic.  If you need to fax, please fax to clinic fax number at [phone]    After clinic hours or weekends, please contact [phone] and ask for Endocrinologist-on call      Sincerely,    Dany Erazo MD  Endocrinology

## 2020-08-28 NOTE — PROGRESS NOTES
"Luis Eduardo Stone is a 55 year old male who is being evaluated via a billable telephone visit.      The patient has been notified of following:     \"This telephone visit will be conducted via a call between you and your physician/provider. We have found that certain health care needs can be provided without the need for a physical exam.  This service lets us provide the care you need with a short phone conversation.  If a prescription is necessary we can send it directly to your pharmacy.  If lab work is needed we can place an order for that and you can then stop by our lab to have the test done at a later time.    Telephone visits are billed at different rates depending on your insurance coverage. During this emergency period, for some insurers they may be billed the same as an in-person visit.  Please reach out to your insurance provider with any questions.    If during the course of the call the physician/provider feels a telephone visit is not appropriate, you will not be charged for this service.\"    Patient has given verbal consent for Telephone visit?  Yes    What phone number would you like to be contacted at? [phone]    How would you like to obtain your AV[facility]? [facility]          Endocrinology Note         Luis Eduardo is a 55 year old male who has telephone visit today for hypothyroidism.    HPI  Luis Eduardo Stone is a 55 years old male with hx of hypothyroidism who is here for follow up hypothyroidism. Last seen 9/2018.    He was diagnosed with hypothyroidism in 2001 during routine physical exam. He was on combination of Synthroid 100 mcg and [place] thyroid 30 mg (T4 50 mcg +T3 12.5 mcg) daily from 0805-3253. Due to shortage of [place] thyroid, he was switched to Synthroid 150 mcg alternating with 175 mcg  every other day but did not like it and wanted to use combination of Synthroid and [place] because he felt better.     Interim history:  He is currently on Synthroid 125 mcg and [place] thyroid 30 mg " (T4 50 mcg +T3 5 mcg) over the past 5 years. Lab on 8/24/20 revealed TSH 3.7, FT4 1.07, TT3 80.     He reported that he feels well but would like to make some adjustment of the thyroid medication toward [place] thyroid. He said that mood is stable. He denied chest pain, shortness of breath, palpitation, abdominal pain, diarrhea/constipation, polyuria/polydipsia, fatigue, tremor, weight change, headache, dizziness and insomnia. His vision is stable.     He said that he has been taking selenium to help with the thyroid.    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    Allergies  Allergies   Allergen Reactions     Apple GI Disturbance     Penicillins Rash     Medications  Current Outpatient Medications   Medication Sig Dispense Refill     ALBUTEROL INHALER 17GM This product no longer available       ARMOUR THYROID 30 MG tablet TAKE 1 TABLET BY MOUTH DAILY 10 tablet 0     SYNTHROID 125 MCG tablet Take 1 tablet (125 mcg) by mouth daily 10 tablet 0     Thyroid (LEVOTHYROXINE-LIOTHYRONINE) 30 MG TABS Take 30 mg by mouth daily 90 tablet 3     Family History  No thyroid disease in the family    Social History  Social History     Tobacco Use     Smoking status: Never Smoker     Smokeless tobacco: Never Used   Substance Use Topics     Alcohol use: Yes     Comment: social     Drug use: No   Drinks alcohol occasionally  Sell Vynyl record    ROS  10 points ROS were negative otherwise mentioned in HPI    Physical Exam  Limited due to telephone visit  Speak comfortably over the phone    RESULTS       ASSESSMENT:    Luis Eduardo Stone is a 55 years old male with hx of hypothyroidism who has telephone visit for follow up hypothyroidism    1) Hypothyroidism: diagnosed in 2001. He is feeling well on this combination. However, he would like to adjust toward [place] thyroid.   - will adjust to Synthroid 75 mcg and [place] thyroid 60 mg. He is clinically euthyroid.   - All questions  answered.    PLAN:   - Synthroid 75 mcg and [place] thyroid 60 mg  - repeat thyroid test in 2 months  - return in 1 year    Phone start time: 0915 AM  Phone end time: 0924 AM  Phone duration 9 minutes    Dany Erazo MD  Endocrinology  [phone]

## 2020-11-14 ENCOUNTER — HEALTH MAINTENANCE LETTER (OUTPATIENT)
Age: 55
End: 2020-11-14

## 2020-12-28 NOTE — TELEPHONE ENCOUNTER
Panel Management Review      Patient has the following on his problem list: None      Composite cancer screening  Chart review shows that this patient is due/due soon for the following Colonoscopy  Summary:    Patient is due/failing the following:   COLONOSCOPY    Action needed:   Patient needs referral/order: colonoscopy/FIT     Type of outreach:    Sent letter.    Questions for provider review:    None                                                                                                                                    Earnestine DUNCAN CMA ([facility])       Chart routed to none .           Patient is returning call, please see message below.     Callback Number:     Best Availability: anytime   Can A Detailed Message Be Left? yes  Did you confirm the message with the caller?: yes    Thank you,  Karley Tyson

## 2021-02-23 ENCOUNTER — TELEPHONE (OUTPATIENT)
Dept: ENDOCRINOLOGY | Facility: CLINIC | Age: 56
End: 2021-02-23

## 2021-02-23 NOTE — TELEPHONE ENCOUNTER
Scarlett please schedule him for follow up with Dr Saenz to discuss the change in medications. He will  need a visit and probably lab work after to make the change we don't just change without knowing levels. Zee Negron RN on 2/23/2021 at 1:38 PM

## 2021-03-05 ENCOUNTER — TELEPHONE (OUTPATIENT)
Dept: ENDOCRINOLOGY | Facility: CLINIC | Age: 56
End: 2021-03-05

## 2021-03-05 DIAGNOSIS — E03.9 HYPOTHYROIDISM, UNSPECIFIED TYPE: Primary | ICD-10-CM

## 2021-03-05 DIAGNOSIS — E06.3 HYPOTHYROIDISM DUE TO HASHIMOTO'S THYROIDITIS: ICD-10-CM

## 2021-03-05 RX ORDER — THYROID 60 MG/1
TABLET ORAL
Qty: 45 TABLET | Refills: 3 | Status: SHIPPED | OUTPATIENT
Start: 2021-03-05 | End: 2021-10-07

## 2021-03-05 RX ORDER — LEVOTHYROXINE SODIUM 125 MCG
125 TABLET ORAL DAILY
Qty: 90 TABLET | Refills: 3 | Status: SHIPPED | OUTPATIENT
Start: 2021-03-05 | End: 2021-10-07

## 2021-03-05 NOTE — TELEPHONE ENCOUNTER
Spoke W/ Pt: States that the lab results he is referring to are from August 2020, not November.   Asking to return to original dose. Informed Pt that provider may want to see recent lab results based on current medication. Lab orders placed.  States he does not want to get another lab draw, prefers to use lab results from August.   Rx pended to provider for review.     Marly Mccord, RN on 3/5/2021 at 1:28 PM       RE    Phone Message     May a detailed message be left on voicemail: yes      Reason for Call: Medication Question or concern regarding medication   Prescription Clarification  Name of Medication: SYNTHROID 75 MCG tablet; thyroid (ARMOUR) 60 MG tablet  Prescribing Provider: Dr. Saenz              Pharmacy: [id number] [address] AT [address]              What on the order needs clarification? Pt is requesting to go back to original medication doses of Synthroid 125MCG & [place] 30MG.  Blood work was done in November and pt says it was working better before.  Pt stated that he cannot afford another appt visit.  Pt is asking Dr. Saenz to change doses and forward a new Rx to pharmacy.  Please review and call pt back to discuss options.       Pt is almost out of medication.

## 2021-03-05 NOTE — TELEPHONE ENCOUNTER
[facility] Call Center    Phone Message    May a detailed message be left on voicemail: yes     Reason for Call: Medication Question or concern regarding medication   Prescription Clarification  Name of Medication: SYNTHROID 75 MCG tablet; thyroid (ARMOUR) 60 MG tablet  Prescribing Provider: Dr. Saenz   Pharmacy: [id number] [place] [id number] [facility] [address]   What on the order needs clarification? Pt is requesting to go back to original medication doses of Synthroid 125MCG & [place] 30MG.  Blood work was done in November and pt says it was working better before.  Pt stated that he cannot afford another appt visit.  Pt is asking Dr. Saenz to change doses and forward a new Rx to pharmacy.  Please review and call pt back to discuss options.      Pt is almost out of medication.      Action Taken: Message routed to:  [facility] endo    Travel Screening: Not Applicable

## 2021-03-06 DIAGNOSIS — E03.9 HYPOTHYROIDISM, UNSPECIFIED TYPE: ICD-10-CM

## 2021-03-06 DIAGNOSIS — E06.3 HYPOTHYROIDISM DUE TO HASHIMOTO'S THYROIDITIS: ICD-10-CM

## 2021-03-08 RX ORDER — LEVOTHYROXINE SODIUM 125 MCG
TABLET ORAL
Qty: 10 TABLET | OUTPATIENT
Start: 2021-03-08

## 2021-03-10 DIAGNOSIS — E03.9 HYPOTHYROIDISM, UNSPECIFIED TYPE: ICD-10-CM

## 2021-03-12 RX ORDER — LEVOTHYROXINE, LIOTHYRONINE 19; 4.5 UG/1; UG/1
TABLET ORAL
Qty: 60 TABLET | Refills: 0 | OUTPATIENT
Start: 2021-03-12

## 2021-09-12 ENCOUNTER — HEALTH MAINTENANCE LETTER (OUTPATIENT)
Age: 56
End: 2021-09-12

## 2021-09-30 ENCOUNTER — OFFICE VISIT (OUTPATIENT)
Dept: FAMILY MEDICINE | Facility: CLINIC | Age: 56
End: 2021-09-30
Payer: COMMERCIAL

## 2021-09-30 VITALS
BODY MASS INDEX: 25.65 KG/M2 | OXYGEN SATURATION: 96 % | TEMPERATURE: 97.2 F | HEIGHT: 67 IN | RESPIRATION RATE: 16 BRPM | SYSTOLIC BLOOD PRESSURE: 108 MMHG | HEART RATE: 62 BPM | DIASTOLIC BLOOD PRESSURE: 80 MMHG | WEIGHT: 163.4 LBS

## 2021-09-30 DIAGNOSIS — M17.11 PRIMARY OSTEOARTHRITIS OF RIGHT KNEE: Primary | ICD-10-CM

## 2021-09-30 DIAGNOSIS — Z12.11 SCREENING FOR COLON CANCER: ICD-10-CM

## 2021-09-30 DIAGNOSIS — E03.9 HYPOTHYROIDISM, UNSPECIFIED TYPE: ICD-10-CM

## 2021-09-30 PROCEDURE — 99213 OFFICE O/P EST LOW 20 MIN: CPT | Mod: 25 | Performed by: NURSE PRACTITIONER

## 2021-09-30 PROCEDURE — 20610 DRAIN/INJ JOINT/BURSA W/O US: CPT | Performed by: NURSE PRACTITIONER

## 2021-09-30 RX ORDER — LIDOCAINE HYDROCHLORIDE 10 MG/ML
1 INJECTION, SOLUTION EPIDURAL; INFILTRATION; INTRACAUDAL; PERINEURAL ONCE
Status: COMPLETED | OUTPATIENT
Start: 2021-09-30 | End: 2021-09-30

## 2021-09-30 RX ORDER — TRIAMCINOLONE ACETONIDE 40 MG/ML
40 INJECTION, SUSPENSION INTRA-ARTICULAR; INTRAMUSCULAR ONCE
Status: COMPLETED | OUTPATIENT
Start: 2021-09-30 | End: 2021-09-30

## 2021-09-30 RX ADMIN — LIDOCAINE HYDROCHLORIDE 1 ML: 10 INJECTION, SOLUTION EPIDURAL; INFILTRATION; INTRACAUDAL; PERINEURAL at 10:00

## 2021-09-30 RX ADMIN — TRIAMCINOLONE ACETONIDE 40 MG: 40 INJECTION, SUSPENSION INTRA-ARTICULAR; INTRAMUSCULAR at 10:00

## 2021-09-30 ASSESSMENT — MIFFLIN-ST. JEOR: SCORE: 1538.77

## 2021-09-30 NOTE — PROGRESS NOTES
Assessment & Plan     Primary osteoarthritis of right knee  Patient's description consistent with exacerbation of known osteoarthritis. Kenalog injection performed in clinic by KIARA Gee, which patient tolerated well. Discussed that pain may worsen initially but he should see full effects in approximately one week. He can repeat the injection in three months if needed. He will monitor for signs and symptoms of infection, including redness and warmth of the joint, and follow up in clinic if he develops these or his symptoms worsen.   - lidocaine (PF) (XYLOCAINE) 1 % injection 1 mL  - triamcinolone (KENALOG-40) injection 40 mg    Hypothyroidism, unspecified type  Will follow up on labs drawn tomorrow per patient preference and plan to refill his prescription, pending no need for dose adjustment. He will seek care if he develops overt signs of hypothyroidism or hyperthyroidism.  - TSH with free T4 reflex; Future    Screening for colon cancer  Patient is considered low risk for colon cancer and meets criteria for FIT testing. Order placed and patient will collect and submit.   - Fecal colorectal cancer screen (FIT); Future    See Patient Instructions    Return in about 1 month (around 10/30/2021) if symptoms worsen or fail to improve.    Alyssa Kaba DNP-FNP Student on 9/30/2021 at 10:14 AM    Cici Simmons NP  [facility] is a 56 year old who presents for the following health issues ;    HPI     Hypothyroidism Follow-up      Since last visit, patient describes the following symptoms: Weight stable, no hair loss, no skin changes, no constipation, no loose stools      How many servings of fruits and vegetables do you eat daily?  2-3    On average, how many sweetened beverages do you drink each day (Examples: soda, juice, sweet tea, etc.  Do NOT count diet or artificially sweetened beverages)?   0    How many days per week do you exercise enough to make  your heart beat faster? 5    How many minutes a day do you exercise enough to make your heart beat faster? 60 or more    How many days per week do you miss taking your medication? 0    Patient denies any concern for altered thyroid function, including skin/hair/nail changes, weight changes, mood disturbance, or gastrointestinal changes. He has not had labs drawn for >1 year and is requesting a medication refill today. He denies any concerns regarding his medications and states he has been stable on this medication regimen for a number of years.      Musculoskeletal problem/pain  Onset/Duration: 1-2 months   Description  Location: knee - right  Joint Swelling: no  Redness: no  Pain: YES- 5/10  Warmth: no  Intensity:  moderate  Progression of Symptoms:  same and constant  Accompanying signs and symptoms:   Fevers: no  Numbness/tingling/weakness: no  History  Trauma to the area: no  Recent illness:  no  Previous similar problem: YES  Previous evaluation:  YES, last image was 2015, has had cortisone injections in the past, Synvisc   Precipitating or alleviating factors:  Aggravating factors include: standing, walking, lifting, exercise and overuse  Therapies tried and outcome: Synvisc, cortisone injection     Patient's pain is located in the right anterior knee joint. This is a chronic issue for him, but he states the pain has been progressively worsening over the past two months. It is worse with any sort of weight bearing or walking. He denies any neuropathy or pain in other joints. He has not tried anything at home for his pain and has previously received knee injections, the last being in 2016 with good results.     Review of Systems:  CONSTITUTIONAL: NEGATIVE for fever, chills, change in weight  GI: NEGATIVE for constipation or diarrhea  MUSCULOSKELETAL: POSITIVE  for right knee joint pain and NEGATIVE for right knee edema , injury to the right knee, joint instability, joint swelling, joint warmth and radicular  "pain  ENDO: NEGATIVE for temperature intolerance, hair/skin/nail changes  PSYCHIATRIC: NEGATIVE for changes in mood or affect      Objective    /80   Pulse 62   Temp 97.2  F (36.2  C) (Tympanic)   Resp 16   Ht 1.708 m (5' 7.25\")   Wt 74.1 kg (163 lb 6.4 oz)   SpO2 96%   BMI 25.40 kg/m    Body mass index is 25.4 kg/m .     Physical Exam     GENERAL: healthy, alert and no distress  RESP: lungs clear to auscultation - no rales, rhonchi or wheezes  CV: regular rate and rhythm, normal S1 S2, no S3 or S4, no murmur, click or rub, no peripheral edema and peripheral pulses strong  MS: RLE exam shows decreased knee extension, full flexion. Mild tenderness to deep palpation on anterior medial aspect of right knee joint. Mild edema but no erythema noted.   NEURO: Normal strength and tone, mentation intact and speech normal  PSYCH: mentation appears normal, affect normal/bright    Discussed risks and benefits of right knee injection.  Written consent obtained for right knee injection.  Area was marked and cleaned with alcohol prep.  Kenalog 40 mg/Lidocaine 1% 1 ml injected into medial right knee joint.  Patient did get mildly lightheaded and was laid down in supine position for a couple minutes and recovered well.  Band-aid applied over injection site.    Physician Attestation   I, Cici Simmons, was present with the medical/BERHANE student who participated in the service and in the documentation of the note.  I have verified the history and personally performed the physical exam and medical decision making.  I agree with the assessment and plan of care as documented in the note.      Cici Simmons, MÓNICA        "

## 2021-10-01 ENCOUNTER — TELEPHONE (OUTPATIENT)
Dept: FAMILY MEDICINE | Facility: CLINIC | Age: 56
End: 2021-10-01

## 2021-10-01 NOTE — TELEPHONE ENCOUNTER
Reason for Call:  Other     Detailed comments: Patient is calling to report that his heart was rasing 3am in the morning today, euphoric, felt irritable and unusually feeling. Patient had Cortisone shot yesterday and wants to know if this is normal or if he needs to do something regarding this. He still feel worn out today.    Phone Number Patient can be reached at: Home number on file [phone] (home)    Best Time: any    Can we leave a detailed message on this number? YES    Call taken on 10/1/2021 at 1:40 PM by Clementine Raymond

## 2021-10-05 NOTE — TELEPHONE ENCOUNTER
"CHIKA Bolanos    Pt states he feels fine now.  After his cortisone shot on 9/30 he woke up with racing heart he said. \"Like someone gave me cocaine\".  He felt heart palpatations.  He felt irritable  Pt said he did drink alcohol the night of his cortisone shot.  Should he ever have another cortisone injection?   He says it really helped his right knee.  "

## 2021-10-05 NOTE — TELEPHONE ENCOUNTER
Called and spoke to patient.  Patient was drinking alcohol the night before and this occurred the following morning.  Reassured that this is not systemically given and would not cause euphoria.  I would not stop getting injections with this instance since it has not happened in the past.  Reassured that his mild dizziness in clinic after the shot was due to slight vasal vagal response.  Patient is currently not having any symptoms and his knee feels great.  Cici Simmons NP on 10/5/2021 at 12:37 PM

## 2021-10-07 ENCOUNTER — TELEPHONE (OUTPATIENT)
Dept: ENDOCRINOLOGY | Facility: CLINIC | Age: 56
End: 2021-10-07

## 2021-10-07 ENCOUNTER — LAB (OUTPATIENT)
Dept: LAB | Facility: CLINIC | Age: 56
End: 2021-10-07
Payer: COMMERCIAL

## 2021-10-07 DIAGNOSIS — E03.9 HYPOTHYROIDISM, UNSPECIFIED TYPE: ICD-10-CM

## 2021-10-07 DIAGNOSIS — E06.3 HYPOTHYROIDISM DUE TO HASHIMOTO'S THYROIDITIS: ICD-10-CM

## 2021-10-07 LAB
T3 SERPL-MCNC: 119 NG/DL (ref 60–181)
T4 FREE SERPL-MCNC: 1.18 NG/DL (ref 0.76–1.46)
TSH SERPL DL<=0.005 MIU/L-ACNC: 0.87 MU/L (ref 0.4–4)

## 2021-10-07 PROCEDURE — 84480 ASSAY TRIIODOTHYRONINE (T3): CPT

## 2021-10-07 PROCEDURE — 36415 COLL VENOUS BLD VENIPUNCTURE: CPT

## 2021-10-07 PROCEDURE — 84439 ASSAY OF FREE THYROXINE: CPT

## 2021-10-07 PROCEDURE — 84443 ASSAY THYROID STIM HORMONE: CPT

## 2021-10-07 RX ORDER — THYROID 60 MG/1
TABLET ORAL
Qty: 45 TABLET | Refills: 3 | Status: SHIPPED | OUTPATIENT
Start: 2021-10-07 | End: 2022-03-25

## 2021-10-07 RX ORDER — LEVOTHYROXINE SODIUM 125 MCG
125 TABLET ORAL DAILY
Qty: 90 TABLET | Refills: 3 | Status: SHIPPED | OUTPATIENT
Start: 2021-10-07 | End: 2022-03-24

## 2021-10-07 NOTE — TELEPHONE ENCOUNTER
[facility]    Phone Message    May a detailed message be left on voicemail: no     Reason for Call: Other: Pt called requesting that Dr. Saenz be removed from his list of providers and care team because he will not be continuing care with her. Pt is seeing provider Cici Chen NP at the [facility] for his future care. Writer was unable to remove Dr. Saenz from his care team due to it being locked in. Please remove per Pt request. Pt also expressed frustrations that the lab work he had done today at the [facility] has already been reviewed by Dr. Saenz. This information is per Pt, and writer is unsure if Dr. Saenz has reviewed it. Per chart notes under laboratory tab there are already notes from his current provider having reviewed it. Pt expressed concerns about being sent a bill because Dr. Saenz reviewed his recent labs, and writer provided him with number for billing to discuss. Sending to clinic per Pt request, and FYI.     Action Taken: Message routed to:  [facility] endocrine    Travel Screening: Not Applicable

## 2021-10-07 NOTE — TELEPHONE ENCOUNTER
Unable to remove tasma from care team patient is not being charged for any services from [facility] at the time.

## 2021-12-31 ENCOUNTER — OFFICE VISIT (OUTPATIENT)
Dept: FAMILY MEDICINE | Facility: CLINIC | Age: 56
End: 2021-12-31
Payer: COMMERCIAL

## 2021-12-31 ENCOUNTER — HOSPITAL ENCOUNTER (OUTPATIENT)
Dept: ULTRASOUND IMAGING | Facility: CLINIC | Age: 56
Discharge: HOME | End: 2021-12-31
Attending: FAMILY MEDICINE | Admitting: FAMILY MEDICINE
Payer: COMMERCIAL

## 2021-12-31 VITALS
RESPIRATION RATE: 16 BRPM | BODY MASS INDEX: 24.96 KG/M2 | WEIGHT: 159 LBS | SYSTOLIC BLOOD PRESSURE: 110 MMHG | DIASTOLIC BLOOD PRESSURE: 80 MMHG | OXYGEN SATURATION: 97 % | TEMPERATURE: 97.9 F | HEIGHT: 67 IN | HEART RATE: 67 BPM

## 2021-12-31 DIAGNOSIS — M79.89 RIGHT LEG SWELLING: ICD-10-CM

## 2021-12-31 DIAGNOSIS — M79.89 RIGHT LEG SWELLING: Primary | ICD-10-CM

## 2021-12-31 PROCEDURE — 93971 EXTREMITY STUDY: CPT | Mod: RT

## 2021-12-31 PROCEDURE — 99213 OFFICE O/P EST LOW 20 MIN: CPT | Performed by: FAMILY MEDICINE

## 2021-12-31 ASSESSMENT — PAIN SCALES - GENERAL: PAINLEVEL: SEVERE PAIN (7)

## 2021-12-31 ASSESSMENT — MIFFLIN-ST. JEOR: SCORE: 1518.81

## 2021-12-31 NOTE — PROGRESS NOTES
Assessment & Plan     Right leg swelling  Right knee pain   Exam is concerning for DVT.  Proceed with ultrasound to rule out DVT.  Ultrasound returned negative for DVT. Symptoms likely related to osteoarthritis flare.  Discussed conservative cares.  Advise utilizing Aleve 440 mg twice daily for the next week.  To be taken with food.  Can also use Tylenol.  If symptoms persist or worsen advised follow-up.  X-ray of right knee offered to patient today however he declines.  - US Lower Extremity Venous Duplex Right; Future    The risks, benefits and treatment options of prescribed medications or other treatments have been discussed with the patient. The patient verbalized their understanding and should call or follow up if no improvement or if they develop further problems.      [facility] is a 56 year old who presents for the following health issues     HPI   56-year-old male who presents to clinic for evaluation of left calf pain.    Left leg swelling   Left knee pain for years. In the last week he was cutting carpeting and 3 days ago left lower leg started hurting. He states it is not red or hot. He is taking Ibuprofen 400 mg 2 times a day and helping a little. Comes in with crutches.     No chest pain or shortness of breath.  He reports that his right leg is chronically larger than his left.  He also has varicose veins on his right leg which is chronic.  Reports having calf pain and pain with ambulation.   Patient reports that he has fairly significant arthritis in the right knee.  Has underwent steroid injections in the past.  Is utilizing ibuprofen which is helping with his symptoms.       Review of Systems   Constitutional, HEENT, cardiovascular, pulmonary, gi and gu systems are negative, except as otherwise noted.      Objective    /80 (BP Location: Left arm, Patient Position: Chair, Cuff Size: Adult Regular)   Pulse 67   Temp 97.9  F  "(36.6  C) (Tympanic)   Resp 16   Ht 1.708 m (5' 7.25\")   Wt 72.1 kg (159 lb)   SpO2 97%   BMI 24.72 kg/m    Body mass index is 24.72 kg/m .  Physical Exam   General: alert, cooperative, no acute distress   CV: RRR, no murmur  Resp: non-labored breathing, clear to auscultation, no wheezing or rales   Abdomen: Soft, non-tender, no guarding.   Extremities: right calf swollen, superficial varicosities present. Tender over the calf. Right leg is swollen compared to the left.   Right knee: tender throughout. Range of motion limited in flexion and extension due to pain.     "

## 2021-12-31 NOTE — PATIENT INSTRUCTIONS
Can use aleve 440 mg twice daily.   Tylenol 1000 mg up to 4 times daily.     No ibuprofen if taking aleve.     Rest, heat, ice, hydrotherapy.     Stretch the calf, hamstring, work on quad strength with straight leg raises.       Patient Education     Osteoarthritis  Osteoarthritis happens when the cartilage in a joint becomes damaged and worn. This may be from age, wear and tear, overuse of the joint, obesity, or other problems. Osteoarthritis can affect any joint. But it's most common in hands, knees, spine, hips, and feet. Symptoms include joint stiffness, and pain. It's also called degenerative joint disease.   Home care    When a joint is more sore than usual, rest it for a day or two.    Heat can help relieve stiffness. Take a hot bath or apply a heating pad for up to 30 minutes at a time. If symptoms are worse in the morning, using heat just after awakening can help relax the muscle and soothe the joints.     Ice helps relieve pain. It's often used after activity. Use a cold pack wrapped in a thin cloth on the joint for 10 to 15 minutes at a time.     Alternating hot and cold can also help relieve pain. Try this for 20 minutes at a time, several times per day.    Exercise helps prevent the muscles and ligaments around the joint from becoming weak. It also helps maintain function in the joint. Be as active as you can. Talk to your healthcare provider about what activity program is best for you.    Excess weight puts a lot of extra strain on weight-bearing joints of the lower back, hips, knees, feet and ankles. If you are overweight, talk to your healthcare provider about a safe and effective weight loss program.    Use anti-inflammatory medicines as prescribed for pain. This includes acetaminophen or NSAIDs such as ibuprofen or naproxen. Don't take NSAIDs if your healthcare provider has told you that you can't take NSAIDS because of other health problems If needed, topical or injected medicines may be  recommended. Talk with your healthcare provider if these options are not enough to manage your pain. Follow the directions on all over-the-counter medicines.    Talk with your healthcare provider about devices that might help improve your function and reduce pain.    Talk with you healthcare provider about physical therapy to help strengthen your joints and the surrounding muscles.    Follow-up care  Follow up with your healthcare provider, or as advised.   When to seek medical advice  Call your healthcare provider right away if any of these occur:    Redness or swelling of a painful joint    Discharge or pus from a painful joint    Fever of 100.4 F (38 C) or higher, or as directed by your healthcare provider    Worsening joint pain    Decreased ability to move the joint or bear weight on the joint  [employer] last reviewed this educational content on 8/1/2019 2000-2021 [employer]. All rights reserved. This information is not intended as a substitute for professional medical care. Always follow your healthcare professional's instructions.

## 2022-01-01 ASSESSMENT — ASTHMA QUESTIONNAIRES: ACT_TOTALSCORE: 20

## 2022-01-02 ENCOUNTER — HEALTH MAINTENANCE LETTER (OUTPATIENT)
Age: 57
End: 2022-01-02

## 2022-03-21 DIAGNOSIS — E03.9 HYPOTHYROIDISM, UNSPECIFIED TYPE: ICD-10-CM

## 2022-03-21 DIAGNOSIS — E06.3 HYPOTHYROIDISM DUE TO HASHIMOTO'S THYROIDITIS: ICD-10-CM

## 2022-03-24 RX ORDER — LEVOTHYROXINE SODIUM 125 MCG
TABLET ORAL
Qty: 90 TABLET | Refills: 1 | Status: SHIPPED | OUTPATIENT
Start: 2022-03-24 | End: 2022-10-06

## 2022-03-24 NOTE — TELEPHONE ENCOUNTER
"SYNTHROID 125 MCG tablet   Take 1 tablet (125 mcg) by mouth daily      Last Written Prescription Date:  10/7/21  Last Fill Quantity: 90,   # refills: 3   Last Office Visit : 8/28/20 (Dany Erazo MD)    Future Office visit:  none    Routing refill request to provider for review/approval because:  10/7/21 \"Pt is seeing provider Cici Chen NP at the [facility] for his future care\".       "

## 2022-03-24 NOTE — TELEPHONE ENCOUNTER
Please notify patient that I am not a provider that takes patient's and see's them regularly and that he should establish care with a primary care provider.  I can always see him if he is unable to get in with his primary care provider.  Cici Simmons NP on 3/24/2022 at 4:31 PM

## 2022-03-24 NOTE — TELEPHONE ENCOUNTER
Patient told me to message and ask   What doctor will prescribe both Thyroid and Synthroid medications at the same time?  He said he doesn't want to see a doctor that wont prescribe both that not many doctors will.   He will establish care with that doctor.   He said he doesn't need to be seen till October that he has refills but the patient wants to know before scheduling appointments.       Nichelle Lomeli [facility] on 3/24/2022 at 4:39 PM

## 2022-03-25 ENCOUNTER — TELEPHONE (OUTPATIENT)
Dept: FAMILY MEDICINE | Facility: CLINIC | Age: 57
End: 2022-03-25
Payer: COMMERCIAL

## 2022-03-25 DIAGNOSIS — E03.9 HYPOTHYROIDISM, UNSPECIFIED TYPE: ICD-10-CM

## 2022-03-25 DIAGNOSIS — E06.3 HYPOTHYROIDISM DUE TO HASHIMOTO'S THYROIDITIS: ICD-10-CM

## 2022-03-25 RX ORDER — THYROID 60 MG/1
TABLET ORAL
Qty: 45 TABLET | Refills: 1 | Status: SHIPPED | OUTPATIENT
Start: 2022-03-25 | End: 2022-10-06

## 2022-03-25 NOTE — TELEPHONE ENCOUNTER
Reached patient and still continued asking did you ask every doctor here. I told him I can't ask every doctor here that two doctors said you need to see endo for these medications. He said I get it you guys wont help me. I said we can but you need to be followed by Endo for these medications.    Nichelle Lomeli PSC on 3/25/2022 at 9:50 AM

## 2022-03-25 NOTE — TELEPHONE ENCOUNTER
Tried to call patient 3 times and the line was busy.     Nichelle Lomeli PSC on 3/25/2022 at 9:23 AM

## 2022-03-25 NOTE — TELEPHONE ENCOUNTER
The issue is that his case is not straight forward.    Most provider do not prescribe [place]our thyroid since it is unpredictable and not recommended.    Hence, the reason for endocrinology.  Jerardo Padilla MD  Family Medicine

## 2022-03-25 NOTE — TELEPHONE ENCOUNTER
Reason for Call:  Other prescription    Detailed comments: Pharmacy- [facility] called stating that [facility] refilled the synthroid, and the patient Does have refills and is asking to fill the Thyroid ([place]), but Pharmacy wants to know if the provider is ok that he does refills them together.     Also PAMELAI moriah Simmons to look at refill encounter 3/21/2022 as a [facility]    Phone Number [facility] can be reached at: [phone]    Best Time: any    Can we leave a detailed message on this number? YES    Call taken on 3/25/2022 at 9:08 AM by Nichelle Franklin

## 2022-03-25 NOTE — TELEPHONE ENCOUNTER
I called patient with message and he said this is frustrating. He said that he can't see Endocrinology all the time cause its expensive. He said he has been seeing them and taking this medication for years and doesn't understand that we wont prescribe it. He said he as talked with a nurse they told him there is a doctor here that will prescribe them, but she said she could not tell him who it was.    Patient wants to know if there is a provider that would prescribe [place] Medications, that he could maybe switch to that.    Nichelle Lomeli PSC on 3/25/2022 at 7:55 AM

## 2022-05-24 ENCOUNTER — OFFICE VISIT (OUTPATIENT)
Dept: FAMILY MEDICINE | Facility: CLINIC | Age: 57
End: 2022-05-24

## 2022-05-24 ENCOUNTER — ANCILLARY PROCEDURE (OUTPATIENT)
Dept: GENERAL RADIOLOGY | Facility: CLINIC | Age: 57
End: 2022-05-24
Attending: FAMILY MEDICINE
Payer: COMMERCIAL

## 2022-05-24 ENCOUNTER — TELEPHONE (OUTPATIENT)
Dept: FAMILY MEDICINE | Facility: CLINIC | Age: 57
End: 2022-05-24
Payer: COMMERCIAL

## 2022-05-24 VITALS
DIASTOLIC BLOOD PRESSURE: 78 MMHG | WEIGHT: 160 LBS | OXYGEN SATURATION: 97 % | TEMPERATURE: 98.5 F | HEART RATE: 68 BPM | SYSTOLIC BLOOD PRESSURE: 110 MMHG | HEIGHT: 67 IN | BODY MASS INDEX: 25.11 KG/M2

## 2022-05-24 DIAGNOSIS — M25.561 CHRONIC PAIN OF RIGHT KNEE: ICD-10-CM

## 2022-05-24 DIAGNOSIS — G89.29 CHRONIC PAIN OF RIGHT KNEE: Primary | ICD-10-CM

## 2022-05-24 DIAGNOSIS — G89.29 CHRONIC PAIN OF RIGHT KNEE: ICD-10-CM

## 2022-05-24 DIAGNOSIS — M25.561 CHRONIC PAIN OF RIGHT KNEE: Primary | ICD-10-CM

## 2022-05-24 PROCEDURE — 73565 X-RAY EXAM OF KNEES: CPT | Mod: TC | Performed by: RADIOLOGY

## 2022-05-24 PROCEDURE — 99213 OFFICE O/P EST LOW 20 MIN: CPT | Performed by: FAMILY MEDICINE

## 2022-05-24 NOTE — TELEPHONE ENCOUNTER
Vaughn,    Patient calling wanting a cortisone injection in his right knee.  He had his last one per patient 8/2021.  States his right knee is bone on bone and he can barely walk.  Looks like Dr. Salas has a 5 p.m. hold can he have that spot.  Please call the patient with results of this request. Elina MORAES RN
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
## 2022-05-25 ENCOUNTER — PATIENT OUTREACH (OUTPATIENT)
Dept: NURSING | Facility: CLINIC | Age: 57
End: 2022-05-25
Payer: COMMERCIAL

## 2022-05-25 NOTE — PROGRESS NOTES
Clinic Care Coordination Contact  Community Health Worker Initial Outreach    CHW Initial Information Gathering:  Referral Source: PCP  Current living arrangement:: I live in a private home with family  Type of residence:: Private home - stairs  Community Resources: None  Supplies Currently Used at Home: None  Equipment Currently Used at Home: none  Informal Support system:: Family  No PCP office visit in Past Year: No (5/25/22 with Dr. Salas)  Transportation means:: Regular car  CHW Additional Questions  MyChart active?: Yes  Patient sent Social Determinants of Health questionnaire?: Yes    Patient accepts CC: Yes. Patient scheduled for assessment with MO Friedman on 5/27/22 at 11:00. Patient noted desire to discuss financial resources and looking into health insurance options.     ** CHW sent Care Coordination Questionnaires through [employer].     ** CHW assisted patient in scheduling Establish care and preventive care visit with Dr. Salas on 6/25/22.    Loyda Tyson  [facility] Health Worker  [facility]  Clinic Care Coordination   Office: [phone]

## 2022-05-27 ENCOUNTER — PATIENT OUTREACH (OUTPATIENT)
Dept: NURSING | Facility: CLINIC | Age: 57
End: 2022-05-27
Payer: COMMERCIAL

## 2022-05-27 DIAGNOSIS — M25.561 CHRONIC PAIN OF RIGHT KNEE: ICD-10-CM

## 2022-05-27 DIAGNOSIS — G89.29 CHRONIC PAIN OF RIGHT KNEE: ICD-10-CM

## 2022-05-27 NOTE — LETTER
[facility] CARE COORDINATION  May 27, 2022    Luis Eduardo Stone  [address] [address]      Dear Luis Eduardo,    I am a clinic care coordinator who works with the [facility]. I wanted to thank you for spending the time to talk with me.  Below  are the resources we discussed during our call.    [facility]  [phone]  [email or web address]     [facility]- free remote counseling   [email or web address]    Therapy resources:    [facility]  [phone]    [facility]   [phone]    [facility]   [phone]    Sincerely,     Idalia Austin  Care Coordination Team

## 2022-05-27 NOTE — PROGRESS NOTES
Clinic Care Coordination Contact    Situation: Patient referred to Care Coordination for questions related to his health insurance     Background: Pt is not established with a [facility] PCP but is scheduled for an establish care visit on 7/5/22.    Assessment: NAA HASSAN spoke with pt. He is concerned that he may not qualify for tax credits through [employer] based on his income. He is concerned he may need knee replacement and will be stuck with large medical bills.    Pt shared he is working with a [place] Sure Navigator in [place].  NAA HASSAN suggested pt contact [facility] to discuss his current coverage. Pt declined stating he would prefer to talk with insurance navigator.     NAA HASSAN discussed [facility] Jodi Care program and will send information via [employer].     Pt asked for mental health therapy resources. He is currently living with his sister and has financial stressors. Will send resources via [employer]    Plan/Recommendations: Resources sent to pt. Provider to send referral in the future if needed once he is established with PCP.

## 2022-07-19 ENCOUNTER — OFFICE VISIT (OUTPATIENT)
Dept: FAMILY MEDICINE | Facility: CLINIC | Age: 57
End: 2022-07-19
Payer: COMMERCIAL

## 2022-07-19 VITALS
HEART RATE: 60 BPM | WEIGHT: 159 LBS | OXYGEN SATURATION: 98 % | HEIGHT: 67 IN | SYSTOLIC BLOOD PRESSURE: 104 MMHG | DIASTOLIC BLOOD PRESSURE: 62 MMHG | BODY MASS INDEX: 24.96 KG/M2 | TEMPERATURE: 99.2 F

## 2022-07-19 DIAGNOSIS — Z00.00 ROUTINE GENERAL MEDICAL EXAMINATION AT A HEALTH CARE FACILITY: Primary | ICD-10-CM

## 2022-07-19 DIAGNOSIS — Z13.220 SCREENING FOR CHOLESTEROL LEVEL: ICD-10-CM

## 2022-07-19 DIAGNOSIS — E03.4 HYPOTHYROIDISM DUE TO ACQUIRED ATROPHY OF THYROID: ICD-10-CM

## 2022-07-19 DIAGNOSIS — Z12.5 SCREENING FOR PROSTATE CANCER: ICD-10-CM

## 2022-07-19 DIAGNOSIS — Z12.11 SCREENING FOR COLON CANCER: ICD-10-CM

## 2022-07-19 DIAGNOSIS — F43.21 GRIEF REACTION: ICD-10-CM

## 2022-07-19 PROCEDURE — 99396 PREV VISIT EST AGE 40-64: CPT | Performed by: FAMILY MEDICINE

## 2022-07-19 ASSESSMENT — ENCOUNTER SYMPTOMS
ARTHRALGIAS: 0
COUGH: 0
JOINT SWELLING: 0
SORE THROAT: 0
HEMATURIA: 0
HEADACHES: 0
FEVER: 0
HEARTBURN: 0
FREQUENCY: 0
CHILLS: 0
DYSURIA: 0
SHORTNESS OF BREATH: 0
PARESTHESIAS: 0
DIZZINESS: 0
WEAKNESS: 0
CONSTIPATION: 0
NERVOUS/ANXIOUS: 1
ABDOMINAL PAIN: 0
EYE PAIN: 0
HEMATOCHEZIA: 0
NAUSEA: 0
PALPITATIONS: 0
MYALGIAS: 0
DIARRHEA: 0

## 2022-07-19 ASSESSMENT — ASTHMA QUESTIONNAIRES: ACT_TOTALSCORE: 20
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
## 2022-08-08 LAB — NONINV COLON CA DNA+OCC BLD SCRN STL QL: NEGATIVE

## 2022-08-12 ENCOUNTER — MYC MEDICAL ADVICE (OUTPATIENT)
Dept: FAMILY MEDICINE | Facility: CLINIC | Age: 57
End: 2022-08-12

## 2022-08-12 DIAGNOSIS — N52.9 ERECTILE DYSFUNCTION, UNSPECIFIED ERECTILE DYSFUNCTION TYPE: Primary | ICD-10-CM

## 2022-08-12 RX ORDER — TADALAFIL 10 MG/1
10 TABLET ORAL DAILY PRN
Qty: 20 TABLET | Refills: 0 | Status: SHIPPED | OUTPATIENT
Start: 2022-08-12 | End: 2022-10-13 | Stop reason: DRUGHIGH

## 2022-08-12 NOTE — RESULT ENCOUNTER NOTE
Luis Eduardo,  Your lab results were normal/stable. Please feel free to my chart or call the office with questions. Cher Salas M.D.

## 2022-08-12 NOTE — TELEPHONE ENCOUNTER
Routing refill request to provider for review/approval because:  Drug not active on patient's medication list    Mendez Canas RN

## 2022-10-04 ENCOUNTER — LAB (OUTPATIENT)
Dept: LAB | Facility: CLINIC | Age: 57
End: 2022-10-04
Payer: COMMERCIAL

## 2022-10-04 DIAGNOSIS — Z12.5 SCREENING FOR PROSTATE CANCER: ICD-10-CM

## 2022-10-04 DIAGNOSIS — E03.4 HYPOTHYROIDISM DUE TO ACQUIRED ATROPHY OF THYROID: ICD-10-CM

## 2022-10-04 DIAGNOSIS — Z13.220 SCREENING FOR CHOLESTEROL LEVEL: ICD-10-CM

## 2022-10-04 LAB
CHOLEST SERPL-MCNC: 192 MG/DL
HDLC SERPL-MCNC: 61 MG/DL
LDLC SERPL CALC-MCNC: 118 MG/DL
NONHDLC SERPL-MCNC: 131 MG/DL
PSA SERPL-MCNC: 0.61 NG/ML (ref 0–3.5)
T3 SERPL-MCNC: 119 NG/DL (ref 85–202)
TRIGL SERPL-MCNC: 63 MG/DL
TSH SERPL DL<=0.005 MIU/L-ACNC: 0.57 UIU/ML (ref 0.3–4.2)

## 2022-10-04 PROCEDURE — G0103 PSA SCREENING: HCPCS

## 2022-10-04 PROCEDURE — 36415 COLL VENOUS BLD VENIPUNCTURE: CPT

## 2022-10-04 PROCEDURE — 84443 ASSAY THYROID STIM HORMONE: CPT

## 2022-10-04 PROCEDURE — 84480 ASSAY TRIIODOTHYRONINE (T3): CPT

## 2022-10-04 PROCEDURE — 80061 LIPID PANEL: CPT

## 2022-10-05 ENCOUNTER — TELEPHONE (OUTPATIENT)
Dept: NURSING | Facility: CLINIC | Age: 57
End: 2022-10-05

## 2022-10-05 DIAGNOSIS — E03.9 HYPOTHYROIDISM, UNSPECIFIED TYPE: ICD-10-CM

## 2022-10-05 DIAGNOSIS — E06.3 HYPOTHYROIDISM DUE TO HASHIMOTO'S THYROIDITIS: ICD-10-CM

## 2022-10-05 RX ORDER — THYROID 60 MG/1
TABLET ORAL
Qty: 45 TABLET | Refills: 1 | Status: CANCELLED | OUTPATIENT
Start: 2022-10-05

## 2022-10-05 RX ORDER — LEVOTHYROXINE SODIUM 125 MCG
125 TABLET ORAL DAILY
Qty: 90 TABLET | Refills: 1 | Status: CANCELLED | OUTPATIENT
Start: 2022-10-05

## 2022-10-06 ENCOUNTER — MYC MEDICAL ADVICE (OUTPATIENT)
Dept: FAMILY MEDICINE | Facility: CLINIC | Age: 57
End: 2022-10-06

## 2022-10-06 DIAGNOSIS — E03.9 HYPOTHYROIDISM, UNSPECIFIED TYPE: ICD-10-CM

## 2022-10-06 DIAGNOSIS — N52.9 ERECTILE DYSFUNCTION, UNSPECIFIED ERECTILE DYSFUNCTION TYPE: Primary | ICD-10-CM

## 2022-10-06 DIAGNOSIS — E06.3 HYPOTHYROIDISM DUE TO HASHIMOTO'S THYROIDITIS: ICD-10-CM

## 2022-10-06 RX ORDER — THYROID 60 MG/1
TABLET ORAL
Qty: 45 TABLET | Refills: 1 | Status: SHIPPED | OUTPATIENT
Start: 2022-10-06 | End: 2023-01-12

## 2022-10-06 RX ORDER — LEVOTHYROXINE SODIUM 125 MCG
125 TABLET ORAL DAILY
Qty: 90 TABLET | Refills: 1 | Status: SHIPPED | OUTPATIENT
Start: 2022-10-06 | End: 2023-01-12

## 2022-10-13 PROBLEM — N52.9 ERECTILE DYSFUNCTION, UNSPECIFIED ERECTILE DYSFUNCTION TYPE: Status: ACTIVE | Noted: 2022-10-13

## 2022-10-13 RX ORDER — TADALAFIL 20 MG/1
20 TABLET ORAL DAILY PRN
Qty: 20 TABLET | Refills: 3 | Status: SHIPPED | OUTPATIENT
Start: 2022-10-13 | End: 2023-01-12

## 2022-11-19 ENCOUNTER — HEALTH MAINTENANCE LETTER (OUTPATIENT)
Age: 57
End: 2022-11-19

## 2023-01-12 ENCOUNTER — TELEPHONE (OUTPATIENT)
Dept: FAMILY MEDICINE | Facility: CLINIC | Age: 58
End: 2023-01-12

## 2023-01-12 DIAGNOSIS — N52.9 ERECTILE DYSFUNCTION, UNSPECIFIED ERECTILE DYSFUNCTION TYPE: ICD-10-CM

## 2023-01-12 DIAGNOSIS — E03.9 HYPOTHYROIDISM, UNSPECIFIED TYPE: ICD-10-CM

## 2023-01-12 DIAGNOSIS — E06.3 HYPOTHYROIDISM DUE TO HASHIMOTO'S THYROIDITIS: ICD-10-CM

## 2023-01-12 RX ORDER — THYROID 60 MG/1
TABLET ORAL
Qty: 45 TABLET | Refills: 1 | Status: SHIPPED | OUTPATIENT
Start: 2023-01-12 | End: 2023-07-07

## 2023-01-12 RX ORDER — TADALAFIL 20 MG/1
20 TABLET ORAL DAILY PRN
Qty: 20 TABLET | Refills: 3 | Status: SHIPPED | OUTPATIENT
Start: 2023-01-12

## 2023-01-12 RX ORDER — LEVOTHYROXINE SODIUM 125 MCG
125 TABLET ORAL DAILY
Qty: 90 TABLET | Refills: 1 | Status: SHIPPED | OUTPATIENT
Start: 2023-01-12 | End: 2023-06-12

## 2023-01-12 NOTE — TELEPHONE ENCOUNTER
Patient called and says he is having a hard time with [facility] in [place] and is asking for his medications be sent to the [facility] in [place] please.  Patient has been without his meds for 2 days now.      Lynn Gomez, MARY Mendes

## 2023-01-12 NOTE — TELEPHONE ENCOUNTER
FYI - Status Update    Who is Calling: patient    Update: Can someone please send medication to [facility] [phone]. [facility] is having a hard time filling prescriptions and PT is upset and wants prescriptions transferred. PT has been without medication for 2 days and wants all meds transfered    Does caller want a call/response back: Yes     Could we send this information to you in [employer] or would you prefer to receive a phone call?:   Patient would prefer a phone call   Okay to leave a detailed message?: Yes at Home number on file [phone] (home)

## 2023-06-12 ENCOUNTER — OFFICE VISIT (OUTPATIENT)
Dept: FAMILY MEDICINE | Facility: CLINIC | Age: 58
End: 2023-06-12
Payer: COMMERCIAL

## 2023-06-12 ENCOUNTER — NURSE TRIAGE (OUTPATIENT)
Dept: NURSING | Facility: CLINIC | Age: 58
End: 2023-06-12
Payer: COMMERCIAL

## 2023-06-12 VITALS
HEIGHT: 67 IN | DIASTOLIC BLOOD PRESSURE: 70 MMHG | HEART RATE: 65 BPM | SYSTOLIC BLOOD PRESSURE: 132 MMHG | OXYGEN SATURATION: 97 % | RESPIRATION RATE: 22 BRPM | TEMPERATURE: 98.3 F | BODY MASS INDEX: 24.69 KG/M2 | WEIGHT: 157.3 LBS

## 2023-06-12 DIAGNOSIS — E03.9 HYPOTHYROIDISM, UNSPECIFIED TYPE: Primary | ICD-10-CM

## 2023-06-12 DIAGNOSIS — J02.9 ACUTE SORE THROAT: ICD-10-CM

## 2023-06-12 DIAGNOSIS — B96.89 BACTERIAL CONJUNCTIVITIS OF BOTH EYES: ICD-10-CM

## 2023-06-12 DIAGNOSIS — R04.2 HEMOPTYSIS: ICD-10-CM

## 2023-06-12 DIAGNOSIS — H66.92 LEFT ACUTE OTITIS MEDIA: ICD-10-CM

## 2023-06-12 DIAGNOSIS — H10.9 BACTERIAL CONJUNCTIVITIS OF BOTH EYES: ICD-10-CM

## 2023-06-12 DIAGNOSIS — J06.9 VIRAL URI: ICD-10-CM

## 2023-06-12 LAB
DEPRECATED S PYO AG THROAT QL EIA: NEGATIVE
GROUP A STREP BY PCR: NOT DETECTED

## 2023-06-12 PROCEDURE — 87651 STREP A DNA AMP PROBE: CPT | Performed by: NURSE PRACTITIONER

## 2023-06-12 PROCEDURE — 99214 OFFICE O/P EST MOD 30 MIN: CPT | Performed by: NURSE PRACTITIONER

## 2023-06-12 PROCEDURE — 87635 SARS-COV-2 COVID-19 AMP PRB: CPT | Performed by: NURSE PRACTITIONER

## 2023-06-12 RX ORDER — AZITHROMYCIN 250 MG/1
TABLET, FILM COATED ORAL
Qty: 6 TABLET | Refills: 0 | Status: SHIPPED | OUTPATIENT
Start: 2023-06-12 | End: 2023-06-17

## 2023-06-12 RX ORDER — LEVOTHYROXINE SODIUM 125 MCG
125 TABLET ORAL DAILY
Qty: 90 TABLET | Refills: 0 | Status: SHIPPED | OUTPATIENT
Start: 2023-06-12 | End: 2023-10-02

## 2023-06-12 RX ORDER — POLYMYXIN B SULFATE AND TRIMETHOPRIM 1; 10000 MG/ML; [USP'U]/ML
2 SOLUTION OPHTHALMIC EVERY 6 HOURS
Qty: 10 ML | Refills: 0 | Status: SHIPPED | OUTPATIENT
Start: 2023-06-12 | End: 2023-10-02

## 2023-06-12 ASSESSMENT — PATIENT HEALTH QUESTIONNAIRE - PHQ9
SUM OF ALL RESPONSES TO PHQ QUESTIONS 1-9: 5
10. IF YOU CHECKED OFF ANY PROBLEMS, HOW DIFFICULT HAVE THESE PROBLEMS MADE IT FOR YOU TO DO YOUR WORK, TAKE CARE OF THINGS AT HOME, OR GET ALONG WITH OTHER PEOPLE: SOMEWHAT DIFFICULT
SUM OF ALL RESPONSES TO PHQ QUESTIONS 1-9: 5

## 2023-06-12 ASSESSMENT — ASTHMA QUESTIONNAIRES
ACT_TOTALSCORE: 10
ACT_TOTALSCORE: 10
QUESTION_2 LAST FOUR WEEKS HOW OFTEN HAVE YOU HAD SHORTNESS OF BREATH: MORE THAN ONCE A DAY
QUESTION_3 LAST FOUR WEEKS HOW OFTEN DID YOUR ASTHMA SYMPTOMS (WHEEZING, COUGHING, SHORTNESS OF BREATH, CHEST TIGHTNESS OR PAIN) WAKE YOU UP AT NIGHT OR EARLIER THAN USUAL IN THE MORNING: FOUR OR MORE NIGHTS A WEEK
QUESTION_1 LAST FOUR WEEKS HOW MUCH OF THE TIME DID YOUR ASTHMA KEEP YOU FROM GETTING AS MUCH DONE AT WORK, SCHOOL OR AT HOME: A LITTLE OF THE TIME
QUESTION_5 LAST FOUR WEEKS HOW WOULD YOU RATE YOUR ASTHMA CONTROL: SOMEWHAT CONTROLLED
QUESTION_4 LAST FOUR WEEKS HOW OFTEN HAVE YOU USED YOUR RESCUE INHALER OR NEBULIZER MEDICATION (SUCH AS ALBUTEROL): THREE OR MORE TIMES PER DAY

## 2023-06-12 ASSESSMENT — PAIN SCALES - GENERAL: PAINLEVEL: SEVERE PAIN (6)

## 2023-06-12 NOTE — PROGRESS NOTES
Assessment and Plan:       ICD-10-CM    1. Hypothyroidism, unspecified type  E03.9 SYNTHROID 125 MCG tablet      2. Bacterial conjunctivitis of both eyes  H10.9 trimethoprim-polymyxin b (POLYTRIM) 59777-6.1 UNIT/ML-% ophthalmic solution    B96.89       3. Left acute otitis media  H66.92 azithromycin (ZITHROMAX) 250 MG tablet      4. Acute sore throat  J02.9 Symptomatic COVID-19 Virus (Coronavirus) by PCR Nasopharyngeal     Streptococcus A Rapid Screen w/Reflex to PCR - Clinic Collect     Group A Streptococcus PCR Throat Swab      5. Viral URI  J06.9       6. Hemoptysis  R04.2         Hypothyroidism is controlled.  He continues Synthroid.  We will treat conjunctivitis with Polytrim eyedrops.  We will treat acute otitis media with Z-Jesus, take as directed.  Educated on indications and side effects.  Rapid strep is negative.  Strep culture pending.  COVID test ordered.  Symptoms appear consistent with viral URI.  Offered chest x-ray due to concerns of hemoptysis, but he declines.  Discussed symptomatic treatment.  Recommend using over-the-counter nasal saline sprays to assist with sinus congestion.  No wheezing heard on auscultation today.  He will continue albuterol as needed.  Recommend follow-up with PCP if symptoms persist or worsen.  He is content with the plan.    Subjective:     Luis Eduardo is a 58 year old male presenting to the clinic for concerns for cold symptoms for 7 days.  Patient complains of sinus congestion, productive cough, left ear pressure, right ear pain, headache, wheezing.  Patient states he has been producing mucus with slight blood when he coughs.  Patient has a history of asthma and has been using his albuterol inhaler 4-5 times daily.  He developed pink eyes Saturday night.  He has had some discharge and crusting from the eyes.  He denies stomachache, nausea, vomiting.  He has felt feverish, but has not taken his temperature.  His sister is ill as well.  He has been taking over-the-counter  Advil.  Patient has hypothyroidism and is taking Synthroid 125 mcg daily.  Last TSH was 0.57 on 10/4/2022.    Reviewof Systems: A complete 14 point review of systems was obtained and is negative or as stated in the history of present illness.    Social History     Socioeconomic History     Marital status: Single     Spouse name: Not on file     Number of children: Not on file     Years of education: Not on file     Highest education level: Not on file   Occupational History     Not on file   Tobacco Use     Smoking status: Never     Smokeless tobacco: Never   Vaping Use     Vaping status: Never Used   Substance and Sexual Activity     Alcohol use: Yes     Comment: social     Drug use: No     Sexual activity: Yes     Partners: Female   Other Topics Concern     Parent/sibling w/ CABG, MI or angioplasty before 65F 55M? No   Social History Narrative     Not on file     Social Determinants of Health     Financial Resource Strain: Not on file   Food Insecurity: Not on file   Transportation Needs: Not on file   Physical Activity: Not on file   Stress: Not on file   Social Connections: Not on file   Intimate Partner Violence: Not on file   Housing Stability: Not on file       Active Ambulatory Problems     Diagnosis Date Noted     CARDIOVASCULAR SCREENING; LDL GOAL LESS THAN 160 03/02/2015     Hypothyroidism 03/02/2015     Mild asthma exacerbation 04/08/2015     Family history of pseudogout 10/06/2015     Erectile dysfunction, unspecified erectile dysfunction type 10/13/2022     Psychosexual dysfunction with inhibited sexual excitement 05/03/2006     Migraine headache 05/03/2006     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   Skin: Warm, dry.  No lesions or rashes.  Skin turgor rapid return.   HEENT:  Head normocephalic, atraumatic.  Eyes conjunctiva slightly injected bilaterally.  Right ear is normal.  Left ear is erythematous, TM bulging.  Nose patent, mucosa red.  Oropharynx slightly erythematous.  No lesions or tonsillar enlargement.   Neck: Supple, no lymphadenopathy.   Lungs:  Clear to auscultation. Respirations even and unlabored.  No wheezing or rales noted.   Heart:  Regular rate and rhythm.  No murmurs.      LABORATORY: Rapid strep, strep culture ordered.  Rapid strep is negative.  COVID test ordered.            Answers for HPI/ROS submitted by the patient on 6/12/2023  If you checked off any problems, how difficult have these problems made it for you to do your work, take care of things at home, or get along with other people?: Somewhat difficult  PHQ9 TOTAL SCORE: 5  How many servings of fruits and vegetables do you eat daily?: 0-1  On average, how many sweetened beverages do you drink each day (Examples: soda, juice, sweet tea, etc.  Do NOT count diet or artificially sweetened beverages)?: 0  How many minutes a day do you exercise enough to make your heart beat faster?: 20 to 29  How many days a week do you exercise enough to make your heart beat faster?: 3 or less  How many days per week do you miss taking your medication?: 0  What is the reason for your visit today?: fever ear plugged sore throat bronchial  When did your symptoms begin?: 3-7 days ago      "

## 2023-06-12 NOTE — TELEPHONE ENCOUNTER
Sick for one week. Pink eye out of no where. Sore ear right, plugged up other ear.  Coughing, fever for over four days.  Thinks he might have pneumonia.  Coughing up colored phlegm.  I connected with scheduling for an appointment today or tomorrow and advised urgent care if they can't get him in. He understands.  Italia Bah RN  [facility]        Reason for Disposition    All other earaches  (Exceptions: Earache lasting < 1 hour, and earache from air travel.)    Additional Information    Negative: Sounds like a life-threatening emergency to the triager    Negative: Moving the earlobe or touching the ear clearly increases the pain    Negative: Pink or red swelling behind the ear    Negative: Stiff neck (can't touch chin to chest)    Negative: Patient sounds very sick or weak to the triager    Negative: Severe earache pain     Pain at 4.    Negative: Fever > 103 F (39.4 C)    Negative: Pointed object was inserted into the ear canal (e.g., a pencil, stick, or wire)    Negative: White, yellow, or green discharge    Negative: Bloody discharge or unexplained bleeding from ear canal    Negative: Diabetes mellitus or a weak immune system (e.g., HIV positive, cancer chemotherapy, transplant patient)    Negative: New blurred vision or vision changes    Protocols used: EARACHE-A-OH

## 2023-06-13 LAB — SARS-COV-2 RNA RESP QL NAA+PROBE: NEGATIVE

## 2023-06-19 ENCOUNTER — OFFICE VISIT (OUTPATIENT)
Dept: FAMILY MEDICINE | Facility: CLINIC | Age: 58
End: 2023-06-19
Payer: COMMERCIAL

## 2023-06-19 VITALS
OXYGEN SATURATION: 97 % | TEMPERATURE: 98.5 F | HEART RATE: 66 BPM | BODY MASS INDEX: 24.64 KG/M2 | SYSTOLIC BLOOD PRESSURE: 108 MMHG | DIASTOLIC BLOOD PRESSURE: 66 MMHG | WEIGHT: 157 LBS | HEIGHT: 67 IN | RESPIRATION RATE: 12 BRPM

## 2023-06-19 DIAGNOSIS — H66.002 NON-RECURRENT ACUTE SUPPURATIVE OTITIS MEDIA OF LEFT EAR WITHOUT SPONTANEOUS RUPTURE OF TYMPANIC MEMBRANE: Primary | ICD-10-CM

## 2023-06-19 PROCEDURE — 99214 OFFICE O/P EST MOD 30 MIN: CPT | Performed by: FAMILY MEDICINE

## 2023-06-19 RX ORDER — CEFPROZIL 500 MG/1
500 TABLET, FILM COATED ORAL 2 TIMES DAILY
Qty: 14 TABLET | Refills: 0 | Status: SHIPPED | OUTPATIENT
Start: 2023-06-19 | End: 2023-06-26

## 2023-06-19 NOTE — PROGRESS NOTES
"  Assessment & Plan     Non-recurrent acute suppurative otitis media of left ear without spontaneous rupture of tympanic membrane  Recurrent otitis media with ear pressure and pain, trial of Cefzil discussed and prescribed, urgent referral to ENT placed.  Could be a candidate for ventilation tube placement.  - Adult ENT  Referral; Future  - cefPROZIL (CEFZIL) 500 MG tablet; Take 1 tablet (500 mg) by mouth 2 times daily for 7 days    Review of external notes as documented elsewhere in note  30 minutes spent by me on the date of the encounter doing chart review, review of outside records, review of test results, interpretation of tests, patient visit and documentation            MD ANDREA [facility]    Cecille Zaragoza is a 58 year old, presenting for the following health issues:  Ear Problem (X 1 week ago. Stated ear is plugged. Feel discomfort.)        6/19/2023     4:41 PM   Additional Questions   Roomed by Harry MENDEZ   Accompanied by self     HPI     Left ear pain and pressure, was treated for acute otitis media couple weeks ago with azithromycin, pain did subside for a few days only.  Seems to be returning with pain and pressure in the left ear, right ear seems to be intact.  No hearing loss. but  sensation of being underwater.  No dizziness.      Review of Systems   Constitutional, HEENT, cardiovascular, pulmonary, gi and gu systems are negative, except as otherwise noted.      Objective    /66 (BP Location: Left arm, Patient Position: Sitting, Cuff Size: Adult Regular)   Pulse 66   Temp 98.5  F (36.9  C) (Oral)   Resp 12   Ht 1.702 m (5' 7\")   Wt 71.2 kg (157 lb)   SpO2 97%   BMI 24.59 kg/m    Body mass index is 24.59 kg/m .  Physical Exam   GENERAL: healthy, alert and no distress  HENT: normal cephalic/atraumatic, left ear: erythematous, bulging membrane and Narrow ear canals, nose and mouth without ulcers or lesions, oropharynx clear and oral mucous " membranes moist  NECK: no adenopathy, no asymmetry, masses, or scars and thyroid normal to palpation  RESP: lungs clear to auscultation - no rales, rhonchi or wheezes  CV: regular rate and rhythm, normal S1 S2, no S3 or S4, no murmur, click or rub, no peripheral edema and peripheral pulses strong  ABDOMEN: soft, nontender, no hepatosplenomegaly, no masses and bowel sounds normal  MS: no gross musculoskeletal defects noted, no edema    This note was completed in part using a voice recognition software, any grammatical or context distortion are unintentional and inherent to the software.              Prior to immunization administration, verified patients identity using patient s name and date of birth. Please see Immunization Activity for additional information.     Screening Questionnaire for Adult Immunization    Are you sick today?   Don't Know   Do you have allergies to medications, food, a vaccine component or latex?   Yes   Have you ever had a serious reaction after receiving a vaccination?   No   Do you have a long-term health problem with heart, lung, kidney, or metabolic disease (e.g., diabetes), asthma, a blood disorder, no spleen, complement component deficiency, a cochlear implant, or a spinal fluid leak?  Are you on long-term aspirin therapy?   Yes   Do you have cancer, leukemia, HIV/AIDS, or any other immune system problem?   No   Do you have a parent, brother, or sister with an immune system problem?   No   In the past 3 months, have you taken medications that affect  your immune system, such as prednisone, other steroids, or anticancer drugs; drugs for the treatment of rheumatoid arthritis, Crohn s disease, or psoriasis; or have you had radiation treatments?   No   Have you had a seizure, or a brain or other nervous system problem?   No   During the past year, have you received a transfusion of blood or blood    products, or been given immune (gamma) globulin or antiviral drug?   No   For women: Are  you pregnant or is there a chance you could become       pregnant during the next month?   No   Have you received any vaccinations in the past 4 weeks?   No     Immunization questionnaire was positive for at least one answer.  Notified Dr. Munroe.      Patient instructed to remain in clinic for 15 minutes afterwards, and to report any adverse reactions.     Screening performed by Harry Matamoros MA on 6/19/2023 at 4:45 PM.

## 2023-06-20 ENCOUNTER — TELEPHONE (OUTPATIENT)
Dept: OTOLARYNGOLOGY | Facility: CLINIC | Age: 58
End: 2023-06-20
Payer: COMMERCIAL

## 2023-06-20 NOTE — TELEPHONE ENCOUNTER
[facility] Call Center    Phone Message    May a detailed message be left on voicemail: yes     Reason for Call: Symptoms or Concerns     If patient has red-flag symptoms, warm transfer to triage line    Current symptom or concern: Patient scheduled 1st available appointment, he is also on the cancellation list.     Stated he is not having dizziness or facial weakness.     He would like to be seen sooner. Referral was sent as urgent.        Action Taken: Other: ENT    Travel Screening: Not Applicable

## 2023-06-20 NOTE — TELEPHONE ENCOUNTER
Pt called the clinic where he saw the doctor who put in a referral. Writer helped him get scheduled for an appt outside of [facility] for Friday which is the soonest we could get him in. I will be faxing over the referral to the other clinic.

## 2023-06-23 ENCOUNTER — TRANSFERRED RECORDS (OUTPATIENT)
Dept: HEALTH INFORMATION MANAGEMENT | Facility: CLINIC | Age: 58
End: 2023-06-23
Payer: COMMERCIAL

## 2023-09-09 ENCOUNTER — HEALTH MAINTENANCE LETTER (OUTPATIENT)
Age: 58
End: 2023-09-09

## 2023-10-02 ENCOUNTER — OFFICE VISIT (OUTPATIENT)
Dept: FAMILY MEDICINE | Facility: CLINIC | Age: 58
End: 2023-10-02
Payer: COMMERCIAL

## 2023-10-02 VITALS
HEIGHT: 67 IN | BODY MASS INDEX: 24.48 KG/M2 | OXYGEN SATURATION: 97 % | SYSTOLIC BLOOD PRESSURE: 98 MMHG | WEIGHT: 156 LBS | DIASTOLIC BLOOD PRESSURE: 60 MMHG | HEART RATE: 67 BPM | TEMPERATURE: 98.3 F

## 2023-10-02 DIAGNOSIS — R53.83 OTHER FATIGUE: ICD-10-CM

## 2023-10-02 DIAGNOSIS — E03.4 HYPOTHYROIDISM DUE TO ACQUIRED ATROPHY OF THYROID: Primary | ICD-10-CM

## 2023-10-02 LAB — TSH SERPL DL<=0.005 MIU/L-ACNC: 1.1 UIU/ML (ref 0.3–4.2)

## 2023-10-02 PROCEDURE — 84443 ASSAY THYROID STIM HORMONE: CPT | Performed by: FAMILY MEDICINE

## 2023-10-02 PROCEDURE — 36415 COLL VENOUS BLD VENIPUNCTURE: CPT | Performed by: FAMILY MEDICINE

## 2023-10-02 PROCEDURE — 99213 OFFICE O/P EST LOW 20 MIN: CPT | Performed by: FAMILY MEDICINE

## 2023-10-02 RX ORDER — LEVOTHYROXINE SODIUM 125 MCG
125 TABLET ORAL DAILY
Qty: 90 TABLET | Refills: 3 | Status: SHIPPED | OUTPATIENT
Start: 2023-10-02 | End: 2023-11-22

## 2023-10-02 RX ORDER — THYROID 30 MG/1
30 TABLET ORAL DAILY
Qty: 90 TABLET | Refills: 3 | Status: SHIPPED | OUTPATIENT
Start: 2023-10-02 | End: 2023-11-22

## 2023-10-02 ASSESSMENT — ASTHMA QUESTIONNAIRES: ACT_TOTALSCORE: 21

## 2023-10-02 NOTE — PROGRESS NOTES
"  Assessment & Plan     Hypothyroidism due to acquired atrophy of thyroid    - TSH WITH FREE T4 REFLEX; Future  - SYNTHROID 125 MCG tablet; Take 1 tablet (125 mcg) by mouth daily  - thyroid (NP THYROID) 30 MG tablet; Take 1 tablet (30 mg) by mouth daily  - TSH WITH FREE T4 REFLEX  Consider imunopersion tests if symptoms resist. None known    Other fatigue  Patient will decide if he would leeanne to see this. The daily consumption will likely due to hard\hydrosis,             FUTURE APPOINTMENTS:       - Follow-up for annual visit or as needed    Cher Salas MD  [facility] SANDRO Zaragoza is a 58 year old, presenting for the following health issues:  Recheck Medication    Waiting on vaccination.         10/2/2023    12:17 PM   Additional Questions   Roomed by Francie SIFUENTES CMA       History of Present Illness       Reason for visit:  Test thyroid / testoterone    He eats 0-1 servings of fruits and vegetables daily.He consumes 0 sweetened beverage(s) daily.He exercises with enough effort to increase his heart rate 10 to 19 minutes per day.  He exercises with enough effort to increase his heart rate 4 days per week.   He is taking medications regularly.         Hypothyroidism Follow-up    Since last visit, patient describes the following symptoms: Weight stable, no hair loss, no skin changes, no constipation, no loose stools  He has some erectile issues and he has been feeling a little bit older his brother in law takes a testosterone supplement   He has been doing ok on his medications but would         Review of Systems   Constitutional, HEENT, cardiovascular, pulmonary, gi and gu systems are negative, except as otherwise noted.      Objective    BP 98/60 (BP Location: Right arm, Patient Position: Sitting, Cuff Size: Adult Regular)   Pulse 67   Temp 98.3  F (36.8  C) (Tympanic)   Ht 1.702 m (5' 7\")   Wt 70.8 kg (156 lb)   SpO2 97%   BMI 24.43 kg/m    Body mass index is 24.43 " kg/m .  Physical Exam   GENERAL: healthy, alert and no distress  EYES: Eyes grossly normal to inspection, PERRL and conjunctivae and sclerae normal  HENT: ear canals and TM's normal, nose and mouth without ulcers or lesions  NECK: no adenopathy, no asymmetry, masses, or scars and thyroid normal to palpation  RESP: lungs clear to auscultation - no rales, rhonchi or wheezes  CV: regular rate and rhythm, normal S1 S2, no S3 or S4, no murmur, click or rub, no peripheral edema and peripheral pulses strong  SKIN: no suspicious lesions or rashes  NEURO: Normal strength and tone, mentation intact and speech normal  PSYCH: mentation appears normal, affect normal/bright    Office Visit on 06/12/2023   Component Date Value Ref Range Status    SARS CoV2 PCR 06/12/2023 Negative  Negative Final    NEGATIVE: SARS-CoV-2 (COVID-19) RNA not detected, presumed negative.    Group A Strep antigen 06/12/2023 Negative  Negative Final    Group A strep by PCR 06/12/2023 Not Detected  Not Detected Final       Cher Salas M.D.

## 2023-10-02 NOTE — COMMUNITY RESOURCES LIST (ENGLISH)
10/02/2023   [facility]  N/A  For questions about this resource list or additional care needs, please contact your primary care clinic or care manager.  Phone: [phone]   Email: N/A   Address: [address] [place] [address]   Hours: N/A        Financial Stability       Rent and mortgage payment assistance  1  [employer] Distance: 1.13 miles      In-Person, Phone/Virtual   [address] [place] [address]  Language: English  Hours: Mon - Sun Appt. Only  Fees: Free   Phone: (804) 331-7054 Email: [email or web address] Website: http://www.[facility].org     2  [facility] - Rent payment assistance Distance: 2.33 miles      In-Person, Phone/Virtual   [address] [place] [address]  Language: English  Hours: Mon - Fri 8:00 AM - 4:30 PM  Fees: Free   Phone: (240) 962-7724 Email: oralia@[facility]. Website: https://www.[facility]./Facilities/Facility/Details/23          Food and Nutrition       Food pantry  3  [facility] Distance: 0.29 miles      Pickup   [address] [place] [address]  Language: English  Hours: Mon - Tue 9:00 AM - 5:00 PM , Thu 9:00 AM - 5:00 PM , Sat 9:00 AM - 12:00 PM  Fees: Free   Phone: (429) 205-6195 Ext.112 Email: marina@[facility].org Website: http://www.[facility].org     4  [facility] - Dry Food Pantry Distance: 0.95 miles      Pickup   [address] [place] [address]  Language: English  Hours: Mon - Fri 9:00 AM - 3:00 PM  Fees: Free   Phone: (230) 984-9749 Email: office@[employer]ter Website: http://www.[facility].net/     SNAP application assistance  5  [facility] Distance: 2.33 miles      In-Person, Phone/Virtual   [address] [place] [address]  Language: English  Hours: Mon - Fri 8:00 AM - 4:30 PM  Fees: Free   Phone:  (873) 988-9339 Email: oralia@[facility]. Website: https://www.[facility]/Facilities/Facility/Details/23     6  [facility] Community Services - Economic Support [facility] Distance: 18.39 miles      In-Person, Phone/Virtual   [address] [place] [address]  Language: English  Hours: Mon - Fri 8:00 AM - 4:30 PM  Fees: Free   Phone: (846) 255-9315 Email: oralia@[facility] Website: https://www.[facility]/787/Economic-Support     Soup kitchen or free meals  7  [facility] - Homeless Outreach Services Team - Food Assistance Distance: 14.79 miles      Pickup   [address] [place] [address]  Language: English  Hours: Mon - Thu 9:30 AM - 3:30 PM  Fees: Free   Phone: (103) 304-6610 Email: office@[employer] Website: https://www.[facility]/community-resource-center/     8  [facility] Family [facility] - Thursday Night Community Meal Distance: 14.9 miles      In-Person   [address] [place] [address]  Language: English, Indonesian  Hours: Thu 6:00 PM - 7:00 PM  Fees: Free   Phone: (370) 372-7622 Email: center@[facility] Website: https://www.[facility] [facility]          Important Numbers & Websites       Emergency Services   [address]  [place] [address]  Poison Control   (900) 819-9931  Suicide Prevention Lifeline   (157) 441-2992 (TALK)  Child Abuse Hotline   (196) 302-1973 (4-A-Child)  Sexual Assault Hotline   (910) 354-5830 (HOPE)  National Runaway Safeline   (631) 590-4142 (RUNAWAY)  All-Options Talkline   (274) 956-1390  Substance Abuse Referral   (194) 232-3120 (HELP)

## 2023-11-21 ENCOUNTER — NURSE TRIAGE (OUTPATIENT)
Dept: NURSING | Facility: CLINIC | Age: 58
End: 2023-11-21
Payer: COMMERCIAL

## 2023-11-21 DIAGNOSIS — E03.4 HYPOTHYROIDISM DUE TO ACQUIRED ATROPHY OF THYROID: ICD-10-CM

## 2023-11-22 RX ORDER — LEVOTHYROXINE SODIUM 125 MCG
125 TABLET ORAL DAILY
Qty: 90 TABLET | Refills: 2 | Status: SHIPPED | OUTPATIENT
Start: 2023-11-22

## 2023-11-22 RX ORDER — THYROID 30 MG/1
30 TABLET ORAL DAILY
Qty: 90 TABLET | Refills: 2 | Status: SHIPPED | OUTPATIENT
Start: 2023-11-22

## 2023-11-22 NOTE — TELEPHONE ENCOUNTER
"Triage Call:    Caller: Patient    Patient is requesting to get synthroid prescription sent to a pharmacy. He doesn't think he got it filled when given the paper copy in October, but can't remember.   Advised that current pharamcy would need to be contacted by new pharamcy to get it transferred, as we gave with refills.  \"I can't remember\".  Now he would like it to be filled at the [facility] in [facility] , as is out of the medication and doesn't have the paper prescription anymore.    Advised clinic team would evaluate and then would reach out with questions.       Caller verbalized understanding of instructions and questions answered.      Gabrielle Logan RN on 11/21/2023 at 7:14 PM      Reason for Disposition   [1] Prescription refill request for NON-ESSENTIAL medicine (i.e., no harm to patient if med not taken) AND [2] triager unable to refill per department policy    Protocols used: Medication Refill and Renewal Call-A-    "

## 2023-12-27 DIAGNOSIS — E03.9 HYPOTHYROIDISM, UNSPECIFIED TYPE: ICD-10-CM

## 2023-12-27 DIAGNOSIS — E06.3 HYPOTHYROIDISM DUE TO HASHIMOTO'S THYROIDITIS: ICD-10-CM

## 2023-12-27 RX ORDER — THYROID 60 MG/1
TABLET ORAL
Qty: 45 TABLET | Refills: 1 | OUTPATIENT
Start: 2023-12-27

## 2024-03-19 DIAGNOSIS — E06.3 HYPOTHYROIDISM DUE TO HASHIMOTO'S THYROIDITIS: ICD-10-CM

## 2024-03-19 DIAGNOSIS — E03.9 HYPOTHYROIDISM, UNSPECIFIED TYPE: ICD-10-CM

## 2024-03-19 RX ORDER — THYROID 60 MG/1
TABLET ORAL
Qty: 45 TABLET | Refills: 1 | OUTPATIENT
Start: 2024-03-19

## 2024-10-21 DIAGNOSIS — E03.4 HYPOTHYROIDISM DUE TO ACQUIRED ATROPHY OF THYROID: ICD-10-CM

## 2024-10-21 NOTE — TELEPHONE ENCOUNTER
Medication Question or Refill        What medication are you calling about (include dose and sig)?:      SYNTHROID 125 MCG tablet       thyroid (NP THYROID) 30 MG tablet         Preferred Pharmacy:{All of the patient's preferred pharmacies will display below, confirm with patient which pharmacy they prefer to use for this request and delete the others :[id number]}       [id number] - [place], [place] - [address]  [address] [facility] [address]  Phone: [phone] Fax: [phone]        Controlled Substance Agreement on file:   CSA -- Patient Level:    CSA: None found at the patient level.       Who prescribed the medication?: Maritza Augustin    Do you need a refill? Yes    When did you use the medication last?     Patient offered an appointment? No    Do you have any questions or concerns?  Yes: patient states [employer] has dumped a lot of their member off the plan, and Patient is one of them. He is asking to for an extension for his medications listed above until he can get his insurance situation situated.       Could we send this information to you in [facility] [facility] or would you prefer to receive a phone call?:   Patient would prefer a phone call   Okay to leave a detailed message?: Yes at Cell number on file:    Telephone Information:   Mobile [phone]

## 2024-10-21 NOTE — TELEPHONE ENCOUNTER
Medication Question or Refill        What medication are you calling about (include dose and sig)?:      SYNTHROID 125 MCG tablet     Preferred Pharmacy:     [id number] - [facility] [address] AT [facility] [address] [facility] [address]  [address] [facility] [address]  Phone: [phone] Fax: [phone]            Controlled Substance Agreement on file:   CSA -- Patient Level:    CSA: None found at the patient level.       Who prescribed the medication?:  Cher Romero    Do you need a refill? Yes    When did you use the medication last? today    Patient offered an appointment? No    Do you have any questions or concerns?  Yes: patient states [employer] has dumped a lot of their member off the plan, and Patient is one of them. He is asking to for an extension for these two medications:    SYNTHROID 125 MCG tablet   thyroid (NP THYROID) 30 MG tablet     As of today, Patient is in need of a refill for the Synthroid 125 MCG      Could we send this information to you in [employer] or would you prefer to receive a phone call?:   Patient would prefer a phone call   Okay to leave a detailed message?: Yes at Cell number on file:    Telephone Information:   Mobile [phone]

## 2024-10-22 RX ORDER — LEVOTHYROXINE SODIUM 125 MCG
125 TABLET ORAL DAILY
Qty: 90 TABLET | Refills: 0 | Status: SHIPPED | OUTPATIENT
Start: 2024-10-22

## 2024-11-02 ENCOUNTER — HEALTH MAINTENANCE LETTER (OUTPATIENT)
Age: 59
End: 2024-11-02

## 2025-01-04 RX ORDER — THYROID,PORK 60 MG
30 TABLET ORAL DAILY
Qty: 45 TABLET | OUTPATIENT
Start: 2025-01-04

## 2025-01-08 NOTE — TELEPHONE ENCOUNTER
Pt called in regarding prescription. He does not have enough medication to get to appt. He is also upset that his medication lists NP Thyroid still. He is only taking Synthroid and [place]. He is needing a refill of [place]. He understands it is not covered under his insurance and pays out of pocket for it.       thyroid (ARMOUR) 60 MG tablet Sig: Take 1/2 tablet (30mg) by mouth daily     Preferred pharmacy [facility].    Ani Cotton on 1/8/2025 at 2:28 PM

## 2025-01-18 ENCOUNTER — TELEPHONE (OUTPATIENT)
Dept: NURSING | Facility: CLINIC | Age: 60
End: 2025-01-18

## 2025-01-18 DIAGNOSIS — E03.4 HYPOTHYROIDISM DUE TO ACQUIRED ATROPHY OF THYROID: ICD-10-CM

## 2025-01-18 NOTE — TELEPHONE ENCOUNTER
Nurse Triage SBAR    Is this a 2nd Level Triage? NO    Situation: Pt calling for the refill of his thyroid medication.     Background: Thyroid medication refill request was done on 1/8/2024, but refused     Assessment: Writer unable to see why the refill was refused. Pt states he has an appt scheduled for 1/28/2025 and requested a refill to get him thru to that date.     Pt specifically is asking for a refill of the medication Thyroid ([place])    Recommendation: Pt was advised to contact his clinic on Monday morning to discuss with his care team. Writer can see that a new refill request was sent to the clinic prior to transferring to [facility]    Shreya Barry RN   Triage Nurse Advisor on 1/18/2025 at 1:31 PM

## 2025-01-19 RX ORDER — THYROID,PORK 60 MG
TABLET ORAL
Qty: 45 TABLET | OUTPATIENT
Start: 2025-01-19

## 2025-01-21 RX ORDER — THYROID 30 MG/1
30 TABLET ORAL DAILY
Qty: 7 TABLET | Refills: 0 | OUTPATIENT
Start: 2025-01-21

## 2025-01-21 NOTE — TELEPHONE ENCOUNTER
Filled again for 30 d patient had v=been told since oct that he needed an in person visit and labs.

## 2025-01-21 NOTE — TELEPHONE ENCOUNTER
Patient has not had his medication since around the 13th. Wondering if he should postpone his visit so his lab levels have time to regulate, or if he should keep visit with you and have labs ordered for the future.    Cannot afford two office visits at this time, so would either like to do office visit at the same time as labs or an office visit now with labs ordered for future lab only visit when his thyroid levels regulate if that is a concern.    Please advise.

## 2025-01-21 NOTE — TELEPHONE ENCOUNTER
Patient contacted [facility] again, stating he does not have enough thyroid 30mg tablets to make it to his 1/28/25 appointment with Dr. Salas.    Can patient have rebecca refill until appointment? Unable to fill via RN protocol. Pended 7 day supply.

## 2025-01-22 NOTE — TELEPHONE ENCOUNTER
He should keep that appointment. We can do some labs later but he has not been seen for quite a while.

## 2025-01-22 NOTE — TELEPHONE ENCOUNTER
Called patient and relayed information. States he will keep 1/28/25 appointment. No further questions at this time.

## 2025-01-28 ENCOUNTER — OFFICE VISIT (OUTPATIENT)
Dept: FAMILY MEDICINE | Facility: CLINIC | Age: 60
End: 2025-01-28
Payer: COMMERCIAL

## 2025-01-28 VITALS
TEMPERATURE: 98.6 F | HEART RATE: 75 BPM | WEIGHT: 171 LBS | DIASTOLIC BLOOD PRESSURE: 64 MMHG | HEIGHT: 67 IN | SYSTOLIC BLOOD PRESSURE: 128 MMHG | BODY MASS INDEX: 26.84 KG/M2 | OXYGEN SATURATION: 96 %

## 2025-01-28 DIAGNOSIS — Z91.199 NONCOMPLIANCE WITH DIAGNOSTIC TESTING: ICD-10-CM

## 2025-01-28 DIAGNOSIS — E03.9 ACQUIRED HYPOTHYROIDISM: ICD-10-CM

## 2025-01-28 DIAGNOSIS — Z00.00 ROUTINE GENERAL MEDICAL EXAMINATION AT A HEALTH CARE FACILITY: Primary | ICD-10-CM

## 2025-01-28 DIAGNOSIS — Z12.5 SCREENING FOR PROSTATE CANCER: ICD-10-CM

## 2025-01-28 DIAGNOSIS — E03.4 HYPOTHYROIDISM DUE TO ACQUIRED ATROPHY OF THYROID: ICD-10-CM

## 2025-01-28 PROCEDURE — 99396 PREV VISIT EST AGE 40-64: CPT | Performed by: FAMILY MEDICINE

## 2025-01-28 RX ORDER — LEVOTHYROXINE SODIUM 125 MCG
125 TABLET ORAL DAILY
Qty: 90 TABLET | Refills: 3 | Status: SHIPPED | OUTPATIENT
Start: 2025-01-28

## 2025-01-28 RX ORDER — THYROID 30 MG/1
30 TABLET ORAL DAILY
Qty: 90 TABLET | Refills: 3 | Status: CANCELLED | OUTPATIENT
Start: 2025-01-28

## 2025-01-28 RX ORDER — THYROID,PORK 60 MG
TABLET ORAL
Qty: 45 TABLET | Refills: 3 | Status: SHIPPED | OUTPATIENT
Start: 2025-01-28

## 2025-01-28 SDOH — HEALTH STABILITY: PHYSICAL HEALTH: ON AVERAGE, HOW MANY MINUTES DO YOU ENGAGE IN EXERCISE AT THIS LEVEL?: 20 MIN

## 2025-01-28 SDOH — HEALTH STABILITY: PHYSICAL HEALTH: ON AVERAGE, HOW MANY DAYS PER WEEK DO YOU ENGAGE IN MODERATE TO STRENUOUS EXERCISE (LIKE A BRISK WALK)?: 3 DAYS

## 2025-01-28 ASSESSMENT — SOCIAL DETERMINANTS OF HEALTH (SDOH): HOW OFTEN DO YOU GET TOGETHER WITH FRIENDS OR RELATIVES?: TWICE A WEEK

## 2025-01-28 ASSESSMENT — ASTHMA QUESTIONNAIRES
QUESTION_2 LAST FOUR WEEKS HOW OFTEN HAVE YOU HAD SHORTNESS OF BREATH: NOT AT ALL
QUESTION_3 LAST FOUR WEEKS HOW OFTEN DID YOUR ASTHMA SYMPTOMS (WHEEZING, COUGHING, SHORTNESS OF BREATH, CHEST TIGHTNESS OR PAIN) WAKE YOU UP AT NIGHT OR EARLIER THAN USUAL IN THE MORNING: NOT AT ALL
QUESTION_5 LAST FOUR WEEKS HOW WOULD YOU RATE YOUR ASTHMA CONTROL: SOMEWHAT CONTROLLED
QUESTION_4 LAST FOUR WEEKS HOW OFTEN HAVE YOU USED YOUR RESCUE INHALER OR NEBULIZER MEDICATION (SUCH AS ALBUTEROL): ONCE A WEEK OR LESS
ACT_TOTALSCORE: 22
QUESTION_1 LAST FOUR WEEKS HOW MUCH OF THE TIME DID YOUR ASTHMA KEEP YOU FROM GETTING AS MUCH DONE AT WORK, SCHOOL OR AT HOME: NONE OF THE TIME
ACT_TOTALSCORE: 22

## 2025-01-28 NOTE — PROGRESS NOTES
"Preventive Care Visit  [facility] SANDRO Salas MD, Family Medicine  Jan 28, 2025      Assessment & Plan     Routine general medical examination at a health care facility      Hypothyroidism due to acquired atrophy of thyroid    - SYNTHROID 125 MCG tablet; Take 1 tablet (125 mcg) by mouth daily.  - ARMOUR THYROID 60 MG tablet; Take 1/2 tab daily  - **TSH with free T4 reflex FUTURE 2mo; Future  Patient request brand name for both [place] Thyroid and also Synthroid he does not want any substitutes in the desiccated hormone supplement.  Acquired hypothyroidism      Screening for prostate cancer  Has a family history screen today  - PSA, screen; Future    Patient has been advised of split billing requirements and indicates understanding: Yes         (Z91.199) Noncompliance with diagnostic testing  Comment:   He has been asked to come in for thyroid labs since probably a year ago.  I have given him a limited supply but he must come in for his fasting labs.  The labs are there I have explained them to him if he does not have them done I will need to stop refilling his medication and then he can follow-up with whether he wants a primary care physician or a specialist.  Mom put the [place] medication  Out good    BMI  Estimated body mass index is 26.98 kg/m  as calculated from the following:    Height as of this encounter: 1.695 m (5' 6.75\").    Weight as of this encounter: 77.6 kg (171 lb).       Counseling  Appropriate preventive services were addressed with this patient via screening, questionnaire, or discussion as appropriate for fall prevention, nutrition, physical activity, Tobacco-use cessation, social engagement, weight loss and cognition.  Checklist reviewing preventive services available has been given to the patient.  Reviewed patient's diet, addressing concerns and/or questions.   He is at risk for lack of exercise and has been provided with information to increase physical activity for " the benefit of his well-being.   The patient reports drinking more than 3 alcoholic drinks per day and/or more than 7 drhnks per week. The patient was counseled and given information about possible harmful effects of excessive alcohol intake.    See Patient Instructions    Subjective   Nik is a 59 year old, presenting for the following:  Physical        1/28/2025     1:25 PM   Additional Questions   Roomed by Francie SIFUENTES CMA        Can't do labs today, since he has not been taking medication.   Was out of his Thyroid, has only taken 2 days of medication.   Still taking the synthroid  Having excessive feet sweating for the last 2 weeks.   Worried about being charge for double visit. Talked to billing and was told will not get billed.   Would like HPV vaccination and newest Covid.   Troubles with [facility], unsure which pharmacy.   Frustrated he did not get the refill or a call on 1/4/25.  Francie Rodriguez CMA    HPI      Hypothyroidism Follow-up  Since last visit, patient describes the following symptoms: Weight stable, no hair loss, no skin changes, no constipation, no loose stools        Health Care Directive  Patient does not have a Health Care Directive: Discussed advance care planning with patient; information given to patient to review.      1/28/2025   General Health   How would you rate your overall physical health? Good   Feel stress (tense, anxious, or unable to sleep) Only a little   (!) STRESS CONCERN      1/28/2025   Nutrition   Three or more servings of calcium each day? Yes   Diet: Regular (no restrictions)   How many servings of fruit and vegetables per day? (!) 0-1   How many sweetened beverages each day? 0-1         1/28/2025   Exercise   Days per week of moderate/strenous exercise 3 days   Average minutes spent exercising at this level 20 min         1/28/2025   Social Factors   Frequency of gathering with friends or relatives Twice a week   Worry food won't last until get money to buy more No   Food  not last or not have enough money for food? No   Do you have housing? (Housing is defined as stable permanent housing and does not include staying ouside in a car, in a tent, in an abandoned building, in an overnight shelter, or couch-surfing.) Yes   Are you worried about losing your housing? Yes   Lack of transportation? No   Unable to get utilities (heat,electricity)? No   Want help with housing or utility concern? No   (!) HOUSING CONCERN PRESENT      1/28/2025   Fall Risk   Fallen 2 or more times in the past year? No   Trouble with walking or balance? No          1/28/2025   Dental   Dentist two times every year? Yes         1/28/2025   TB Screening   Were you born outside of the [place]? No         Today's PHQ-2 Score:       1/28/2025     1:18 PM   PHQ-2 ( 1999 [employer])   Q1: Little interest or pleasure in doing things 1   Q2: Feeling down, depressed or hopeless 1   PHQ-2 Score 2    Q1: Little interest or pleasure in doing things Several days   Q2: Feeling down, depressed or hopeless Several days   PHQ-2 Score 2       Patient-reported           1/28/2025   Substance Use   Alcohol more than 3/day or more than 7/wk Yes   How often do you have a drink containing alcohol 4 or more times a week   How many alcohol drinks on typical day 1 or 2   How often do you have 5+ drinks at one occasion Never   Audit 2/3 Score 0   How often not able to stop drinking once started Never   How often failed to do what normally expected Never   How often needed first drink in am after a heavy drinking session Never   How often feeling of guilt or remorse after drinking Never   How often unable to remember what happened the night before Never   Have you or someone else been injured because of your drinking No   Has anyone been concerned or suggested you cut down on drinking No   TOTAL SCORE - AUDIT 4   Do you use any other substances recreationally? No     Social History     Tobacco Use    Smoking status: Never    Smokeless tobacco: Never  "  Vaping Use    Vaping status: Never Used   Substance Use Topics    Alcohol use: Yes     Comment: social    Drug use: No             1/28/2025   One time HIV Screening   Previous HIV test? Yes         1/28/2025   STI Screening   New sexual partner(s) since last STI/HIV test? No   Last PSA:   PSA   Date Value Ref Range Status   04/14/2016 0.57 0 - 4 ug/L Final     Prostate Specific Antigen Screen   Date Value Ref Range Status   10/04/2022 0.61 0.00 - 3.50 ng/mL Final     ASCVD Risk   The 10-year ASCVD risk score (Elton JACOBSEN, et al., 2019) is: 6%    Values used to calculate the score:      Age: 59 years      Sex: Male      Is Non- : No      Diabetic: No      Tobacco smoker: No      Systolic Blood Pressure: 128 mmHg      Is BP treated: No      HDL Cholesterol: 61 mg/dL      Total Cholesterol: 192 mg/dL           Reviewed and updated as needed this visit by Provider                    No past medical history on file.      Review of Systems  Constitutional, HEENT, cardiovascular, pulmonary, gi and gu systems are negative, except as otherwise noted.     Objective    Exam  /64 (BP Location: Right arm, Patient Position: Sitting, Cuff Size: Adult Regular)   Pulse 75   Temp 98.6  F (37  C) (Tympanic)   Ht 1.695 m (5' 6.75\")   Wt 77.6 kg (171 lb)   SpO2 96%   BMI 26.98 kg/m     Estimated body mass index is 26.98 kg/m  as calculated from the following:    Height as of this encounter: 1.695 m (5' 6.75\").    Weight as of this encounter: 77.6 kg (171 lb).    Physical Exam  GENERAL: alert and no distress  NECK: no adenopathy, no asymmetry, masses, or scars  RESP: lungs clear to auscultation - no rales, rhonchi or wheezes  CV: regular rate and rhythm, normal S1 S2, no S3 or S4, no murmur, click or rub, no peripheral edema  MS: no gross musculoskeletal defects noted, no edema  PSYCH: mentation appears normal, affect normal/bright        Signed Electronically by: Cher Salas MD  O " homekay to go

## 2025-01-28 NOTE — PATIENT INSTRUCTIONS
Patient Education   Preventive Care Advice   This is general advice given by our system to help you stay healthy. However, your care team may have specific advice just for you. Please talk to your care team about your preventive care needs.  Nutrition  Eat 5 or more servings of fruits and vegetables each day.  Try wheat bread, brown rice and whole grain pasta (instead of white bread, rice, and pasta).  Get enough calcium and vitamin D. Check the label on foods and aim for 100% of the RDA (recommended daily allowance).  Lifestyle  Exercise at least 150 minutes each week  (30 minutes a day, 5 days a week).  Do muscle strengthening activities 2 days a week. These help control your weight and prevent disease.  No smoking.  Wear sunscreen to prevent skin cancer.  Have a dental exam and cleaning every 6 months.  Yearly exams  See your health care team every year to talk about:  Any changes in your health.  Any medicines your care team has prescribed.  Preventive care, family planning, and ways to prevent chronic diseases.  Shots (vaccines)   HPV shots (up to age 26), if you've never had them before.  Hepatitis B shots (up to age 59), if you've never had them before.  COVID-19 shot: Get this shot when it's due.  Flu shot: Get a flu shot every year.  Tetanus shot: Get a tetanus shot every 10 years.  Pneumococcal, hepatitis A, and RSV shots: Ask your care team if you need these based on your risk.  Shingles shot (for age 50 and up)  General health tests  Diabetes screening:  Starting at age 35, Get screened for diabetes at least every 3 years.  If you are younger than age 35, ask your care team if you should be screened for diabetes.  Cholesterol test: At age 39, start having a cholesterol test every 5 years, or more often if advised.  Bone density scan (DEXA): At age 50, ask your care team if you should have this scan for osteoporosis (brittle bones).  Hepatitis C: Get tested at least once in your life.  STIs (sexually  transmitted infections)  Before age 24: Ask your care team if you should be screened for STIs.  After age 24: Get screened for STIs if you're at risk. You are at risk for STIs (including HIV) if:  You are sexually active with more than one person.  You don't use condoms every time.  You or a partner was diagnosed with a sexually transmitted infection.  If you are at risk for HIV, ask about PrEP medicine to prevent HIV.  Get tested for HIV at least once in your life, whether you are at risk for HIV or not.  Cancer screening tests  Cervical cancer screening: If you have a cervix, begin getting regular cervical cancer screening tests starting at age 21.  Breast cancer scan (mammogram): If you've ever had breasts, begin having regular mammograms starting at age 40. This is a scan to check for breast cancer.  Colon cancer screening: It is important to start screening for colon cancer at age 45.  Have a colonoscopy test every 10 years (or more often if you're at risk) Or, ask your provider about stool tests like a FIT test every year or Cologuard test every 3 years.  To learn more about your testing options, visit:   .  For help making a decision, visit:   https://bit.ly/ag80820.  Prostate cancer screening test: If you have a prostate, ask your care team if a prostate cancer screening test (PSA) at age 55 is right for you.  Lung cancer screening: If you are a current or former smoker ages 50 to 80, ask your care team if ongoing lung cancer screenings are right for you.  For informational purposes only. Not to replace the advice of your health care provider. Copyright   2023 [facility]. All rights reserved. Clinically reviewed by the [facility]. [employer] [id number] - REV 01/24.  9 Ways to Cut Back on Drinking  Maybe you've found yourself drinking more alcohol than you'd prefer. If you want to cut back, here are some ideas to try.    Think before you drink.  Do you really want a drink,  "or is it just a habit? If you're used to having a drink at a certain time, try doing something else then.     Look for substitutes.  Find some no-alcohol drinks that you enjoy, like flavored seltzer water, tea with honey, or tonic with a slice of lime. Or try alcohol-free beer or \"virgin\" cocktails (without the alcohol).     Drink more water.  Use water to quench your thirst. Drink a glass of water before you have any alcohol. Have another glass along with every drink or between drinks.     Shrink your drink.  For example, have a bottle of beer instead of a pint. Use a smaller glass for wine. Choose drinks with lower alcohol content (ABV%). Or use less liquor and more mixer in cocktails.     Slow down.  It's easy to drink quickly and without thinking about it. Pay attention, and make each drink last longer.     Do the math.  Total up how much you spend on alcohol each month. How much is that a year? If you cut back, what could you do with the money you save?     Take a break.  Choose a day or two each week when you won't drink at all. Notice how you feel on those days, physically and emotionally. How did you sleep? Do you feel better? Over time, add more break days.     Count calories.  Would you like to lose some weight? For some people that's a good motivator for cutting back. Figure out how many calories are in each drink. How many does that add up to in a day? In a week? In a month?     Practice saying no.  Be ready when someone offers you a drink. Try: \"Thanks, I've had enough.\" Or \"Thanks, but I'm cutting back.\" Or \"No, thanks. I feel better when I drink less.\"   Current as of: November 15, 2023  Content Version: 14.3    2024 [employer].   Care instructions adapted under license by your healthcare professional. If you have questions about a medical condition or this instruction, always ask your healthcare professional. [employer] disclaims any warranty or liability for your use of this " information.

## 2025-03-03 ENCOUNTER — TELEPHONE (OUTPATIENT)
Dept: FAMILY MEDICINE | Facility: CLINIC | Age: 60
End: 2025-03-03
Payer: COMMERCIAL

## 2025-03-03 DIAGNOSIS — E03.4 HYPOTHYROIDISM DUE TO ACQUIRED ATROPHY OF THYROID: ICD-10-CM

## 2025-03-03 RX ORDER — THYROID,PORK 60 MG
TABLET ORAL
Qty: 45 TABLET | Refills: 0 | Status: SHIPPED | OUTPATIENT
Start: 2025-03-03

## 2025-03-03 NOTE — TELEPHONE ENCOUNTER
Patient called the clinic, he says he needs the [place] script sent to [facility]. The last script on 1-28-25 was local print as he was trying to fill in [place] but [place] has been ill [place], says he thought it would go to both pharmacies. Explained to patient that a script will only be e-scribed or can be printed as local print but not both, will send to preferred pharmacy for patient. Does not need the Synthroid at this time, he will complete the labs when he is feeling better he says.    Will send for 90 days as patient needs labs.        KARON Nagel

## 2025-03-05 ENCOUNTER — TELEPHONE (OUTPATIENT)
Dept: FAMILY MEDICINE | Facility: CLINIC | Age: 60
End: 2025-03-05
Payer: COMMERCIAL

## 2025-03-05 NOTE — TELEPHONE ENCOUNTER
"Pt called regarding request to slightly increase dose of armour thyroid a bit?    Pt says that he had been out of armour thyroid for \"a while\" recently.  While off, he noted that his knee was swollen and looked like he had water on his knee.  Pt believed he was retaining water.  The knee was so swollen that he walked with a limp and it was very painful.    No fevers or redness.    Pt refilled his armour thyroid last week and the swelling and fluid retention went away over a few days period last week.    Pt wonders if there is a connection?    Also, pt states that he is aware that he is due for lab work to be updated.      Advised pt that he make an appt, in person or virtual, to discuss more fully.    Pt refused stating he cannot afford an office visit at this time.  Informed pt that Dr Salas is out of clinic until the end of this month.  Pt prefers to wait for Dr Salas.      Pt declined lab and clinic appt at this time.    Afshan Cid RN     "

## 2025-03-10 NOTE — TELEPHONE ENCOUNTER
After he has been on his regular armour dose for 3 months he should have lab check.  That is when he will get the best results for his money.

## 2025-03-10 NOTE — TELEPHONE ENCOUNTER
Call placed to Patient, to relay Dr Crook message.  Patient states that he would like to decrease the synthroid and increase the [place] dose since he had such symptoms with just the synthroid. (Was out of [place] for about 5 days)  Would like to go more natural route.  Has not sent in prescription to [facility] pharmacy yet. Wanting to know what Dr Salas thinks about changing to more natural.  Would like to wait for Dr Salas to return next week.    Cesar Dumont, Clinic RN   [facility]

## 2025-05-14 ENCOUNTER — LAB (OUTPATIENT)
Dept: LAB | Facility: CLINIC | Age: 60
End: 2025-05-14
Payer: COMMERCIAL

## 2025-05-14 DIAGNOSIS — Z12.5 SCREENING FOR PROSTATE CANCER: ICD-10-CM

## 2025-05-14 DIAGNOSIS — E03.4 HYPOTHYROIDISM DUE TO ACQUIRED ATROPHY OF THYROID: ICD-10-CM

## 2025-05-14 LAB
PSA SERPL DL<=0.01 NG/ML-MCNC: 0.72 NG/ML (ref 0–3.5)
TSH SERPL DL<=0.005 MIU/L-ACNC: 1.15 UIU/ML (ref 0.3–4.2)

## 2025-05-14 PROCEDURE — 36415 COLL VENOUS BLD VENIPUNCTURE: CPT

## 2025-05-14 PROCEDURE — G0103 PSA SCREENING: HCPCS

## 2025-05-14 PROCEDURE — 84443 ASSAY THYROID STIM HORMONE: CPT

## 2025-05-19 DIAGNOSIS — E03.4 HYPOTHYROIDISM DUE TO ACQUIRED ATROPHY OF THYROID: ICD-10-CM

## 2025-05-19 DIAGNOSIS — N52.9 ERECTILE DYSFUNCTION, UNSPECIFIED ERECTILE DYSFUNCTION TYPE: ICD-10-CM

## 2025-05-19 NOTE — TELEPHONE ENCOUNTER
Patient is looking for update from labs on 5/14/25. No notes yet on labs. He is very concerned that he has not had renewal on his medications. He is requesting 1 year of medications to be sent now as he states he does not want to come in before a year from now. He is also requesting the cialis be refilled as well. He would like to know why his messages are taking so long to get answered.     Pharmacy: [facility].     Please advise on labs and medications - only pended cialis as unsure you will change other doses.    Luciana Mir, RN on 5/19/2025 at 4:39 PM

## 2025-05-21 DIAGNOSIS — E03.4 HYPOTHYROIDISM DUE TO ACQUIRED ATROPHY OF THYROID: ICD-10-CM

## 2025-05-21 RX ORDER — THYROID,PORK 60 MG
TABLET ORAL
Qty: 45 TABLET | Refills: 2 | Status: SHIPPED | OUTPATIENT
Start: 2025-05-21

## 2025-05-21 RX ORDER — THYROID,PORK 60 MG
TABLET ORAL
Qty: 45 TABLET | Refills: 0 | Status: CANCELLED | OUTPATIENT
Start: 2025-05-21

## 2025-05-21 NOTE — PROGRESS NOTES
Labs are both fine.  I sent in the [place] Thyroid.  The Synthroid has refills all the way through the end of next January.  He should have received a 3-month supply of the [place] in March so he should not be out of it. Cher Salas M.D.

## 2025-05-21 NOTE — TELEPHONE ENCOUNTER
"Patient is calling and is very upset no one has called him back and asked that his thyroid rx  be sent to pharmacy TODAY.     He states he did not get any response from prior calls.     He states: \"I am very frustrated and worn down and almost in tears. I NEED the thyroid medication sent in TODAY. No one has called me with my lab results, and it has been almost a week, no one has called me back and I called yesterday, too and have gotten no response. I am not sure I even want to keep seeing her..\"     \"Who do I call if I am upset with my care? Do I call the ? Or?..\"     I did advise I would send a note to Clinic manager to call him.     Please advise. Hanna Caicedo RN        "

## 2025-05-22 RX ORDER — TADALAFIL 20 MG/1
20 TABLET ORAL DAILY PRN
Qty: 20 TABLET | Refills: 3 | Status: SHIPPED | OUTPATIENT
Start: 2025-05-22